# Patient Record
Sex: MALE | Race: WHITE | NOT HISPANIC OR LATINO | Employment: UNEMPLOYED | URBAN - NONMETROPOLITAN AREA
[De-identification: names, ages, dates, MRNs, and addresses within clinical notes are randomized per-mention and may not be internally consistent; named-entity substitution may affect disease eponyms.]

---

## 2020-04-18 ENCOUNTER — HOSPITAL ENCOUNTER (INPATIENT)
Facility: HOSPITAL | Age: 60
LOS: 1 days | Discharge: NURSING, REHAB OR LONG-TERM CARE | End: 2020-04-20
Attending: FAMILY MEDICINE | Admitting: INTERNAL MEDICINE

## 2020-04-18 ENCOUNTER — APPOINTMENT (OUTPATIENT)
Dept: CT IMAGING | Facility: HOSPITAL | Age: 60
End: 2020-04-18

## 2020-04-18 ENCOUNTER — APPOINTMENT (OUTPATIENT)
Dept: GENERAL RADIOLOGY | Facility: HOSPITAL | Age: 60
End: 2020-04-18

## 2020-04-18 DIAGNOSIS — R53.83 FATIGUE, UNSPECIFIED TYPE: Primary | ICD-10-CM

## 2020-04-18 DIAGNOSIS — G89.4 CHRONIC PAIN SYNDROME: ICD-10-CM

## 2020-04-18 DIAGNOSIS — L03.119 CELLULITIS OF LOWER EXTREMITY, UNSPECIFIED LATERALITY: ICD-10-CM

## 2020-04-18 PROBLEM — E66.01 MORBID OBESITY (HCC): Chronic | Status: ACTIVE | Noted: 2020-04-18

## 2020-04-18 PROBLEM — E66.01 MORBID OBESITY (HCC): Status: ACTIVE | Noted: 2020-04-18

## 2020-04-18 PROBLEM — R53.1 GENERALIZED WEAKNESS: Chronic | Status: ACTIVE | Noted: 2020-04-18

## 2020-04-18 PROBLEM — R53.1 GENERALIZED WEAKNESS: Status: ACTIVE | Noted: 2020-04-18

## 2020-04-18 LAB
ALBUMIN SERPL-MCNC: 3.4 G/DL (ref 3.5–5.2)
ALBUMIN/GLOB SERPL: 1 G/DL
ALP SERPL-CCNC: 97 U/L (ref 39–117)
ALT SERPL W P-5'-P-CCNC: 54 U/L (ref 1–41)
ANION GAP SERPL CALCULATED.3IONS-SCNC: 14 MMOL/L (ref 5–15)
AST SERPL-CCNC: 46 U/L (ref 1–40)
BASOPHILS # BLD AUTO: 0.04 10*3/MM3 (ref 0–0.2)
BASOPHILS NFR BLD AUTO: 0.3 % (ref 0–1.5)
BILIRUB SERPL-MCNC: 0.9 MG/DL (ref 0.2–1.2)
BILIRUB UR QL STRIP: ABNORMAL
BUN BLD-MCNC: 18 MG/DL (ref 6–20)
BUN/CREAT SERPL: 17.8 (ref 7–25)
CALCIUM SPEC-SCNC: 9.4 MG/DL (ref 8.6–10.5)
CHLORIDE SERPL-SCNC: 93 MMOL/L (ref 98–107)
CK SERPL-CCNC: 446 U/L (ref 20–200)
CLARITY UR: ABNORMAL
CO2 SERPL-SCNC: 25 MMOL/L (ref 22–29)
COLOR UR: ABNORMAL
CREAT BLD-MCNC: 1.01 MG/DL (ref 0.76–1.27)
DEPRECATED RDW RBC AUTO: 40 FL (ref 37–54)
EOSINOPHIL # BLD AUTO: 0.11 10*3/MM3 (ref 0–0.4)
EOSINOPHIL NFR BLD AUTO: 0.8 % (ref 0.3–6.2)
ERYTHROCYTE [DISTWIDTH] IN BLOOD BY AUTOMATED COUNT: 13.1 % (ref 12.3–15.4)
GFR SERPL CREATININE-BSD FRML MDRD: 76 ML/MIN/1.73
GLOBULIN UR ELPH-MCNC: 3.5 GM/DL
GLUCOSE BLD-MCNC: 115 MG/DL (ref 65–99)
GLUCOSE UR STRIP-MCNC: NEGATIVE MG/DL
HCT VFR BLD AUTO: 44.5 % (ref 37.5–51)
HGB BLD-MCNC: 15 G/DL (ref 13–17.7)
HGB UR QL STRIP.AUTO: NEGATIVE
HOLD SPECIMEN: NORMAL
IMM GRANULOCYTES # BLD AUTO: 0.11 10*3/MM3 (ref 0–0.05)
IMM GRANULOCYTES NFR BLD AUTO: 0.8 % (ref 0–0.5)
KETONES UR QL STRIP: ABNORMAL
LEUKOCYTE ESTERASE UR QL STRIP.AUTO: NEGATIVE
LIPASE SERPL-CCNC: 94 U/L (ref 13–60)
LYMPHOCYTES # BLD AUTO: 2.21 10*3/MM3 (ref 0.7–3.1)
LYMPHOCYTES NFR BLD AUTO: 15.1 % (ref 19.6–45.3)
MAGNESIUM SERPL-MCNC: 1.9 MG/DL (ref 1.6–2.6)
MCH RBC QN AUTO: 28.4 PG (ref 26.6–33)
MCHC RBC AUTO-ENTMCNC: 33.7 G/DL (ref 31.5–35.7)
MCV RBC AUTO: 84.1 FL (ref 79–97)
MONOCYTES # BLD AUTO: 1.01 10*3/MM3 (ref 0.1–0.9)
MONOCYTES NFR BLD AUTO: 6.9 % (ref 5–12)
NEUTROPHILS # BLD AUTO: 11.15 10*3/MM3 (ref 1.7–7)
NEUTROPHILS NFR BLD AUTO: 76.1 % (ref 42.7–76)
NITRITE UR QL STRIP: NEGATIVE
NRBC BLD AUTO-RTO: 0 /100 WBC (ref 0–0.2)
PH UR STRIP.AUTO: 5.5 [PH] (ref 5–9)
PLATELET # BLD AUTO: 229 10*3/MM3 (ref 140–450)
PMV BLD AUTO: 11.1 FL (ref 6–12)
POTASSIUM BLD-SCNC: 4.6 MMOL/L (ref 3.5–5.2)
PROT SERPL-MCNC: 6.9 G/DL (ref 6–8.5)
PROT UR QL STRIP: NEGATIVE
RBC # BLD AUTO: 5.29 10*6/MM3 (ref 4.14–5.8)
SODIUM BLD-SCNC: 132 MMOL/L (ref 136–145)
SP GR UR STRIP: 1.02 (ref 1–1.03)
UROBILINOGEN UR QL STRIP: ABNORMAL
WBC NRBC COR # BLD: 14.63 10*3/MM3 (ref 3.4–10.8)
WHOLE BLOOD HOLD SPECIMEN: NORMAL

## 2020-04-18 PROCEDURE — 99285 EMERGENCY DEPT VISIT HI MDM: CPT

## 2020-04-18 PROCEDURE — 83690 ASSAY OF LIPASE: CPT | Performed by: FAMILY MEDICINE

## 2020-04-18 PROCEDURE — 70450 CT HEAD/BRAIN W/O DYE: CPT

## 2020-04-18 PROCEDURE — 80053 COMPREHEN METABOLIC PANEL: CPT | Performed by: FAMILY MEDICINE

## 2020-04-18 PROCEDURE — 83735 ASSAY OF MAGNESIUM: CPT | Performed by: FAMILY MEDICINE

## 2020-04-18 PROCEDURE — 71045 X-RAY EXAM CHEST 1 VIEW: CPT

## 2020-04-18 PROCEDURE — 82550 ASSAY OF CK (CPK): CPT | Performed by: FAMILY MEDICINE

## 2020-04-18 PROCEDURE — 93005 ELECTROCARDIOGRAM TRACING: CPT | Performed by: FAMILY MEDICINE

## 2020-04-18 PROCEDURE — 87635 SARS-COV-2 COVID-19 AMP PRB: CPT | Performed by: FAMILY MEDICINE

## 2020-04-18 PROCEDURE — 93010 ELECTROCARDIOGRAM REPORT: CPT | Performed by: INTERNAL MEDICINE

## 2020-04-18 PROCEDURE — 81003 URINALYSIS AUTO W/O SCOPE: CPT | Performed by: FAMILY MEDICINE

## 2020-04-18 PROCEDURE — 85025 COMPLETE CBC W/AUTO DIFF WBC: CPT | Performed by: FAMILY MEDICINE

## 2020-04-18 RX ORDER — SODIUM CHLORIDE 9 MG/ML
75 INJECTION, SOLUTION INTRAVENOUS CONTINUOUS
Status: DISCONTINUED | OUTPATIENT
Start: 2020-04-18 | End: 2020-04-20 | Stop reason: HOSPADM

## 2020-04-18 RX ORDER — ONDANSETRON 4 MG/1
4 TABLET, ORALLY DISINTEGRATING ORAL EVERY 6 HOURS PRN
Qty: 10 TABLET | Refills: 0 | Status: SHIPPED | OUTPATIENT
Start: 2020-04-18 | End: 2020-04-20 | Stop reason: HOSPADM

## 2020-04-18 RX ORDER — ASPIRIN 81 MG/1
81 TABLET ORAL DAILY
Status: DISCONTINUED | OUTPATIENT
Start: 2020-04-19 | End: 2020-04-20 | Stop reason: HOSPADM

## 2020-04-18 RX ORDER — HYDROCODONE BITARTRATE AND ACETAMINOPHEN 5; 325 MG/1; MG/1
1 TABLET ORAL EVERY 4 HOURS PRN
Status: DISCONTINUED | OUTPATIENT
Start: 2020-04-18 | End: 2020-04-20 | Stop reason: HOSPADM

## 2020-04-18 RX ORDER — SODIUM CHLORIDE 0.9 % (FLUSH) 0.9 %
10 SYRINGE (ML) INJECTION EVERY 12 HOURS SCHEDULED
Status: DISCONTINUED | OUTPATIENT
Start: 2020-04-18 | End: 2020-04-20 | Stop reason: HOSPADM

## 2020-04-18 RX ORDER — SODIUM CHLORIDE 9 MG/ML
125 INJECTION, SOLUTION INTRAVENOUS CONTINUOUS
Status: DISCONTINUED | OUTPATIENT
Start: 2020-04-18 | End: 2020-04-18

## 2020-04-18 RX ORDER — CEPHALEXIN 500 MG/1
500 CAPSULE ORAL 4 TIMES DAILY
Qty: 40 CAPSULE | Refills: 0 | Status: SHIPPED | OUTPATIENT
Start: 2020-04-18 | End: 2020-04-20 | Stop reason: HOSPADM

## 2020-04-18 RX ORDER — SODIUM CHLORIDE 0.9 % (FLUSH) 0.9 %
10 SYRINGE (ML) INJECTION AS NEEDED
Status: DISCONTINUED | OUTPATIENT
Start: 2020-04-18 | End: 2020-04-20 | Stop reason: HOSPADM

## 2020-04-18 RX ADMIN — SODIUM CHLORIDE 1000 ML: 900 INJECTION, SOLUTION INTRAVENOUS at 10:25

## 2020-04-18 NOTE — DISCHARGE PLACEMENT REQUEST
"Bj Cordero (59 y.o. Male)     Date of Birth Social Security Number Address Home Phone MRN    1960  [id number]  [place] [address] [phone] [id number]    Sabianist Marital Status          None        Admission Date Admission Type Admitting Provider Attending Provider Department, Room/Bed    20 Emergency  Boubacar Augustin MD [facility] Emergency Department,     Discharge Date Discharge Disposition Discharge Destination                       Attending Provider:  Boubacar Augustin MD    Allergies:  No Known Allergies    Isolation:  None   Infection:  None   Code Status:  Not on file    Ht:  172.7 cm (68\")   Wt:  142 kg (312 lb 3.2 oz)    Admission Cmt:  None   Principal Problem:  None                Active Insurance as of 2020     Primary Coverage     Payor Plan Insurance Group Employer/Plan Group    ANTHEM MEDICAID AN[place] MEDICAID [id number]     Payor Plan Address Payor Plan Phone Number Payor Plan Fax Number Effective Dates    [address] [address] [phone]  2020 - None Entered    [facility] [id number]       Subscriber Name Subscriber Birth Date Member ID       BJ CORDERO 1960 [id number]                 Emergency Contacts      (Rel.) Home Phone Work Phone Mobile Phone    Isma Pichardo (Relative) [phone] -- [phone]        Bj Cordero #[id number] (Acct:[id number] [facility]# [id number]) (:1960 59 y.o. M) PCP: PETRA BALES ([phone])   16 (Ready for Re-Eval)   Patient Care Timeline (2020 08:49 to 2020 17:00:10)     2020 Event Details User   08:49 Patient arrival  Eduardo Cabrera RN   08:49:36 Patient roomed in ED To room 16  Eduardo Cabrera RN   08:50:44 Assign Attending Boubacar Augustin MD assigned as Attending  Boubacar Augustin MD   08:50:44 Assign Provider  Boubacar Augustin MD   08:50:44 First Provider Evaluation of Patient  Boubacar Augustin MD   08:50:53 " "Disposition Selected  Boubacar Augustin MD   08:50:53 Discharge Disposition Selected ED Disposition set to Discharge  Boubacar Augustin MD   08:56:53 Waiting for Provider Status Selected  Marilu De La Garza RN   08:57 Vital Signs Vital Signs   Temp: 98.6 °F (37 °C) Temp src: Oral   Heart Rate: 109 Heart Rate Source: Monitor   Resp: 20 Resp Rate Source: Visual   BP: 170/70 BP Location: Left arm   BP Method: Automatic Patient Position: Lying   BMI (Calculated): 47.5    Oxygen Therapy   SpO2: 98 % Pulse Oximetry Type: Continuous   Device (Oxygen Therapy): room air    Vitals Timer   Restart Vitals Timer: Yes Restart Vitals Timer: Yes   Height and Weight   Height: 172.7 cm (68\") Height Method: Stated   Weight: 142 kg (312 lb 3.2 oz)Abnormal  Weight Method: Bed scale   Other flowsheet entries   Ideal Body Weight k.4     Marilu De La Garza RN   08:57:17 Chief Complaints Updated + Fall   + Weakness - Generalized  Marilu De La Garza RN   08:57:17 Trigger for Triage Start  Marilu De La Garza RN   08:57:17 Triage Started  Marilu De La Garza RN   08:58 Acuity/Destination Acuity/Destination   Patient Acuity: 3     Marilu De La Garza RN   08:58 Travel, Exposure, and Communicable Disease Screening Infection Risk - Communicable Disease Risk Screening   Do you currently have the following symptoms?: No    Travel Screening   Have you traveled in the last month?: No    Exposure Screening   Have you been exposed to any of these contagious diseases in the last month?: No     Marilu De La Garza RN   08:58 Triage Sepsis Screen Sepsis Screen (1=Yes, 0=No)   Documented or Suspected Infection: No Documented or Suspected Infection Indications: None   Acutely Altered Mental Status: No Temp <96.8 OR >100.9: No   Heart Rate >90: YesAbnormal  Respiratory Rate >20: No   SBP <90 : No MAP <65: No   Blood Glucose >140 (mg/dL) - Not diabetic: Unknown WBC <4 or >12 (10*3/mm3): Unknown   Bands > 10%: Unknown Abnormals (in addition to " "infection question): 1   Result of current screen is:: Negative     Marilu De La Garza RN   08:58 Outbreak Screening Outbreak Screening   In the last 14 days, have you had contact with anyone known to have the 2019 Novel Coronavirus or anyone being tested for the 2019 Novel Coronavirus?: No     Marilu De La Garza RN   08:58 Pain (Adult) Pain (Adult)   Presence of Pain: denies pain/discomfort Preferred Pain Scale: number (Numeric Rating Pain Scale)   Pain Rating (0-10): Rest: 0     Marilu De La Garza RN   08:58:29 Acuity 3 Selected  Marilu De La Garza RN   08:58:36 Allergies Reviewed   Marilu De La Garza RN   08:58:54 Triage Completed AUTOMATIC  Marilu De La Garza RN   08:58:58 Violence Risk Screen (Adult) Violence Risk Screen (Adult)   Feels Like Hurting Others: no Previous Attempt to Harm Others: no    Marilu De La Garza RN   08:59:01 [place] Suicide Severity Rating Scale Past Month (C-SSRS Screen Version) C-SSRS (Screen-Recent) Past Month   Q1 Wished to be Dead (Past Month): no Q2 Suicidal Thoughts (Past Month): no   Q6 Suicide Behavior (Lifetime): no Level of Risk per Screen: screen negative   Suicide Severity Scale Notification   Notified Attending Physician of Suicide Severity Scale: No     Marilu De La Garza RN   08:59:06 Abuse Screen (Adult) Abuse Screen (Adult)   Feels Unsafe at Home or Work/School: no Feels Threatened by Someone: no   Does Anyone Try to Keep You From Having Contact with Others or Doing Things Outside Your Home?: no Physical Signs of Abuse Present: no    Marilu De La Garza RN   09:00 Peripheral IV 04/18/20 0900 Right Antecubital Placed Placement Date/Time: 04/18/20 0900   Placed by External Staff?: EMS  Size (Gauge): 20 G  Orientation: Right  Location: Antecubital  Marilu De La Garza RN   09:00 HPI HPI   Stated Reason for Visit: pt reports he laid on the floor for 5-6 days \"unable to get up, decreased appetite, and generalized weakness\".  History Obtained From: patient    Marilu De La Garza, " RN   09:01 Arrival Documentation Prehospital Activity   Prehospital Treatment: Yes    Blood Glucose   Blood Glucose Meter (mg/dl): 130    Prehospital IV Access   Document Peripheral IV: Yes Crystalloid IV Solution: Normal saline    Marilu De La Garza RN   09:02 Falls PA Model Falls PA   Falls PA score: 4     Inpatient, Batch Job   09:21 Outbreak Screening Outbreak Screening   Do you currently have the following symptoms?: No In the last 14 days, have you had contact with anyone known to have the 2019 Novel Coronavirus or anyone being tested for the 2019 Novel Coronavirus?: No    Braxton Manjarrez RegSched Rep   09:25:11 Registration Completed  Braxton Manjarrez RegSched Rep   09:26:15 Orders Placed CBC & Differential ; Comprehensive Metabolic Panel ; Lipase ; Urinalysis With Culture If Indicated - Urine, Clean Catch ; CK ; Magnesium ; sodium chloride 0.9 % bolus 1,000 mL ; sodium chloride 0.9 % infusion ; XR Chest 1 View ; ECG 12 Lead  Boubacar Augustin MD   09:26:21 Orders Placed CBC Auto Differential  Boubacar Augustin MD   09:30:16 ED/UC IMAGING STARTED XR Chest 1 View  Crista Carver   09:30:16 Imaging Exam Started  Crista Carver   09:35:06 ED/UC IMAGING ENDED XR Chest 1 View  Crista Carver   09:35:06 Imaging Exam Ended  Crista Carver   10:00:40 XR Chest 1 View Resulted Collected: 4/18/2020 09:30   Last updated: 4/18/2020 10:01   Status: Final result  Interface, Rad Results [place] In   10:01:49 ED/UC IMAGING FINAL XR Chest 1 View  Interface, Rad Results [place] In   10:01:49 Imaging Final Result  Interface, Rad Results [place] In   10:01:49 Xray Final Result (Final result) XR CHEST 1 VW  Interface, Rad Results [place] In   10:05:50 Assign Nurse Viola Shankar, RN assigned as Registered Nurse  Viola Shankar RN   10:06 ECG Performed ECG 12 Lead - [[id number]]  Boubacar Augustin MD   10:06:14 ECG 12 Lead Resulted Collected: 4/18/2020 10:02   Last updated:  4/18/2020 10:06   Status: Preliminary result  Interface, Ekg Results In   10:06:20 ED/UC IMAGING PRELIMINARY READ ECG 12 Lead  Interface, Ekg Results In   10:06:20 Imaging Preliminary Result  Interface, Ekg Results In   10:08 Vital Signs Vital Signs   Heart Rate: 104 Resp: 20   Oxygen Therapy   SpO2: 98 %    Vitals Timer   Restart Vitals Timer: Yes     Viola Shankar RN   10:08 Device Vitals Device Data   BP: 184/68Abnormal  (Device Time: 10:08:00) Noninvasive MAP (mmHg): 98 (Device Time: 10:08:00)    Viola Shankar RN   10:13:26 Orders Acknowledged New  - CBC & Differential ; Comprehensive Metabolic Panel ; Lipase ; Urinalysis With Culture If Indicated - Urine, Clean Catch ; CK ; Magnesium ; sodium chloride 0.9 % bolus 1,000 mL ; sodium chloride 0.9 % infusion ; XR Chest 1 View ; ECG 12 Lead  Deleon, Zully D, LPN   10:13:34 Orders Completed ECG 12 Lead  Deleon, Zully D, LPN   10:13:34 Complete ECG 12 Lead Completed ECG 12 Lead  Deleon, Zully D, LPN   10:14 Specimens Collected Light Blue Top  - ID: [id number] Type: Blood   Gold Top - SST  - ID: [id number] Type: Blood  Personnel, Non Lab   10:14 Collect CBC Auto Differential Completed CBC Auto Differential  - Type: Blood  Viola Shankar RN   10:14 Collect CK Completed CK  - Type: Blood  Viola Shankar RN   10:14 Collect Comprehensive Metabolic Panel Completed Comprehensive Metabolic Panel  - Type: Blood  Viola Shankar RN   10:14 Collect Lipase Completed Lipase  - Type: Blood  Viola Shankar RN   10:14 Collect Magnesium Completed Magnesium  - Type: Blood  Viola Shankar RN   10:14 Specimens Collected Comprehensive Metabolic Panel  - ID: [id number] Type: Blood   Lipase  - ID: [id number] Type: Blood   CK  - ID: [id number] Type: Blood   Magnesium  - ID: [id number] Type: Blood   CBC Auto Differential  - ID: [id number] Type: Blood  Viola Shankar RN   10:14:19 Print Label for CBC Auto Differential  Completed CBC Auto Differential  - Type: Blood   Viola Shankar RN   10:14:19 Print Label for CK  Completed CK  - Type: Blood  Viola Shankar RN   10:14:19 Print Label for Comprehensive Metabolic Panel  Completed Comprehensive Metabolic Panel  - Type: Blood  Viola Shankar RN   10:14:19 Print Label for Lipase  Completed Lipase  - Type: Blood  Viola Shankar RN   10:14:19 Print Label for Magnesium  Completed Magnesium  - Type: Blood  Viola Shankar RN   10:19:33 CLEAN CATCH - Print Label for Urinalysis With Culture If Indicated - Urine, Clean Catch  Completed Urinalysis With Culture If Indicated - Urine, Clean Catch  - Type: Urine ; Source: Urine, Clean Catch  Viola Shankar RN   10:20 CLEAN CATCH - Collect Urinalysis With Culture If Indicated - Urine, Clean Catch Completed Urinalysis With Culture If Indicated - Urine, Clean Catch  - Type: Urine ; Source: Urine, Clean Catch  Viola Shankar RN   10:20 Specimens Collected Urinalysis With Culture If Indicated - Urine, Clean Catch  - ID: [id number] Type: Urine  Viola Shankar RN   10:23 Peripheral IV 04/18/20 1023 Left Antecubital Placed Placement Date/Time: 04/18/20 1023   Hand Hygiene Completed: Yes  Size (Gauge): 20 G  Orientation: Left  Location: Antecubital  Site Prep: Chlorhexidine  Total insertion attempts: 1  Patient Tolerance: Tolerated well  Viola Shankar RN   10:23:46 Peripheral IV 04/18/20 1023 Left Antecubital Assessment Site Assessment: Clean; Dry; Intact Line Status: Blood return noted; Saline locked   Dressing Type: Transparent     Viola Shankar RN   10:25 Medication New Bag sodium chloride 0.9 % bolus 1,000 mL - Dose: 1,000 mL ; Rate: 2,000 mL/hr ; Route: Intravenous ; Line: Peripheral IV 04/18/20 1023 Left Antecubital ; Scheduled Time: 0928  Viola Shankar RN   10:25:19 Orders Placed Extra Tubes  Boubacar Augustin MD   10:25:22 Orders Placed Light Blue Top  Boubacar Augustin MD   10:25:23 Orders Placed Gold Top - SST  Boubacar Augustin MD   10:25:26 Orders Acknowledged New   - Extra Tubes  Viola Shankar RN   10:26:02 ED Notes Patient has 1000 bag normal saline running started by EMS     Viola Shankar RN  04/18/20 1026    Viola Shankar RN   10:26:25 Safety (Adult) Safety (Adult)   Safety WDL: WDL except Additional Documentation: Safety Interventions (Group)   Safety Interventions   Safety Precautions/Falls Reduction: (call light at hand, assist with needs)     Viola Shankar RN   10:26:41 Cardiac (Adult) Cardiac (Adult)   Cardiac WDL: WDL except Additional Documentation: ECG (Group)   ECG   Rhythm: sinus tachycardia     Viola Shankar RN   10:26:54 Neuro Cognitive (Adult) Neuro Cognitive (Adult)   Cognitive/Neuro/Behavioral WDL: WDL     Viola Shankar RN   10:26:58 Peripheral Neurovascular (Adult) Peripheral Neurovascular (Adult)   Peripheral Neuro Vascular WDL: WDL except Additional Documentation: Edema (Group)    Viola Shaknar RN   10:27:08 Respiratory Respiratory   Airway WDL: WDL    Respiratory WDL   Respiratory WDL: WDL     Viola Shankar RN   10:27:10 Fall Risk Screen (Adult) [facility] High Risk Falls Assessment (If Fall score is >/=13, add the Fall Risk CPG to the care plan)   Fallen in past 6 months: 0--> No Mental Status: 0--> no mental status change   Elimination: 0--> No elimination issues Mobility: 2--> New mobility issue   Medications: 0--> No meds Nurses' Clinical Judgement: 10   Total Fall Risk Score: 12     Viola Shankar RN   10:27:23 Skin (Adult) Skin (Adult)   Skin WDL: WDL except; color; characteristics Skin Moisture: moist   Skin Integrity: rash; wound     Viola Shankar RN   10:28 Urinalysis With Culture If Indicated - Urine, Clean Catch Resulted Abnormal Result   Collected: 4/18/2020 10:20   Last updated: 4/18/2020 10:28   Status: Final result   Color, UA: Dark Yellow [Ref Range: Yellow, Straw, Dark Yellow, Pina]   Appearance, UA: Slightly CloudyAbnormal  [Ref Range: Clear]   pH, UA: 5.5 [Ref Range: 5.0 - 9.0]   Specific Gravity, UA: 1.020 [Ref  Range: 1.003 - 1.030]   Glucose, UA: Negative [Ref Range: Negative]   Ketones, UA: 15 mg/dL (1+)Abnormal  [Ref Range: Negative]   Bilirubin, UA: Small (1+)Abnormal  [Ref Range: Negative]   Blood, UA: Negative [Ref Range: Negative]   Protein, UA: Negative [Ref Range: Negative]   Leuk Esterase, UA: Negative [Ref Range: Negative]   Nitrite, UA: Negative [Ref Range: Negative]   Urobilinogen, UA: 4.0 E.U./dLAbnormal  [Ref Range: 0.2 - 1.0 E.U./dL]  Cheyenne Lin   10:28 CBC & Differential Resulted Collected: 4/18/2020 10:14   Last updated: 4/18/2020 10:28   Status: Final result  Lab, Background User   10:28 CBC Auto Differential Resulted Abnormal Result   Collected: 4/18/2020 10:14   Last updated: 4/18/2020 10:28   Status: Final result   WBC: 14.63 10*3/kc7Mcnv  [Ref Range: 3.40 - 10.80]   RBC: 5.29 10*6/mm3 [Ref Range: 4.14 - 5.80]   Hemoglobin: 15.0 g/dL [Ref Range: 13.0 - 17.7]   Hematocrit: 44.5 % [Ref Range: 37.5 - 51.0]   MCV: 84.1 fL [Ref Range: 79.0 - 97.0]   MCH: 28.4 pg [Ref Range: 26.6 - 33.0]   MCHC: 33.7 g/dL [Ref Range: 31.5 - 35.7]   RDW: 13.1 % [Ref Range: 12.3 - 15.4]   RDW-SD: 40.0 fl [Ref Range: 37.0 - 54.0]   MPV: 11.1 fL [Ref Range: 6.0 - 12.0]   Platelets: 229 10*3/mm3 [Ref Range: 140 - 450]   Neutrophil %: 76.1 %High  [Ref Range: 42.7 - 76.0]   Lymphocyte %: 15.1 %Low  [Ref Range: 19.6 - 45.3]   Monocyte %: 6.9 % [Ref Range: 5.0 - 12.0]   Eosinophil %: 0.8 % [Ref Range: 0.3 - 6.2]   Basophil %: 0.3 % [Ref Range: 0.0 - 1.5]   Immature Grans %: 0.8 %High  [Ref Range: 0.0 - 0.5]   Neutrophils, Absolute: 11.15 10*3/ae6Szbr  [Ref Range: 1.70 - 7.00]   Lymphocytes, Absolute: 2.21 10*3/mm3 [Ref Range: 0.70 - 3.10]   Monocytes, Absolute: 1.01 10*3/kf2Kuzm  [Ref Range: 0.10 - 0.90]   Eosinophils, Absolute: 0.11 10*3/mm3 [Ref Range: 0.00 - 0.40]   Basophils, Absolute: 0.04 10*3/mm3 [Ref Range: 0.00 - 0.20]   Immature Grans, Absolute: 0.11 10*3/vt7Rvwz  [Ref Range: 0.00 - 0.05]   nRBC: 0.0 /100 WBC [Ref  Range: 0.0 - 0.2]  Lab, Background User   10:28:41 Lab Resulted (Final result) CBC AND DIFFERENTIAL  Lab, Background User   10:28:41 Lab Resulted (Final result) CBC WITH AUTO DIFFERENTIAL  Lab, Background User   10:28:55 History Reviewed Sections reviewed - Medical, Surgical, Alcohol, Tobacco, Drug Use, Sexual Activity, Social Documentation, Family  Viola Shankar RN   10:28:56 Lab Resulted (Final result) URINALYSIS W/ CULTURE IF INDICATED  Lab, Background User   10:28:59 Home Medications Reviewed  Viola Shankar RN   10:30 Device Vitals Device Data   Heart Rate: 102 (Device Time: 10:30:00) SpO2: 95 % (Device Time: 10:30:00)   BP: 132/66 (Device Time: 10:30:00) Noninvasive MAP (mmHg): 91 (Device Time: 10:30:00)    Viola Shankar RN   10:44 Comprehensive Metabolic Panel Resulted Abnormal Result   Collected: 4/18/2020 10:14   Last updated: 4/18/2020 10:44   Status: Final result   Glucose: 115 mg/dLHigh  [Ref Range: 65 - 99]   BUN: 18 mg/dL [Ref Range: 6 - 20]   Creatinine: 1.01 mg/dL [Ref Range: 0.76 - 1.27]   Sodium: 132 mmol/LLow  [Ref Range: 136 - 145]   Potassium: 4.6 mmol/L [Ref Range: 3.5 - 5.2]   Chloride: 93 mmol/LLow  [Ref Range: 98 - 107]   CO2: 25.0 mmol/L [Ref Range: 22.0 - 29.0]   Calcium: 9.4 mg/dL [Ref Range: 8.6 - 10.5]   Total Protein: 6.9 g/dL [Ref Range: 6.0 - 8.5]   Albumin: 3.40 g/dLLow  [Ref Range: 3.50 - 5.20]   ALT (SGPT): 54 U/LHigh  [Ref Range: 1 - 41]   AST (SGOT): 46 U/LHigh  [Ref Range: 1 - 40]   Alkaline Phosphatase: 97 U/L [Ref Range: 39 - 117]   Total Bilirubin: 0.9 mg/dL [Ref Range: 0.2 - 1.2]   eGFR Non  Amer: 76 mL/min/1.73 [Ref Range: >60]   Globulin: 3.5 gm/dL   A/G Ratio: 1.0 g/dL   BUN/Creatinine Ratio: 17.8 [Ref Range: 7.0 - 25.0]   Anion Gap: 14.0 mmol/L [Ref Range: 5.0 - 15.0]  Lab, Background User   10:44 Lipase Resulted Abnormal Result   Collected: 4/18/2020 10:14   Last updated: 4/18/2020 10:44   Status: Final result   Lipase: 94 U/LHigh  [Ref Range: 13 - 60]   Lab, Background User   10:44 CK Resulted Abnormal Result   Collected: 4/18/2020 10:14   Last updated: 4/18/2020 10:44   Status: Final result   Creatine Kinase: 446 U/LHigh  [Ref Range: 20 - 200]  Lab, Background User   10:44 Magnesium Resulted Collected: 4/18/2020 10:14   Last updated: 4/18/2020 10:44   Status: Final result   Magnesium: 1.9 mg/dL [Ref Range: 1.6 - 2.6]  Lab, Background User   10:44:24 Lab Resulted (Final result) MAGNESIUM  Lab, Background User   10:44:24 Lab Resulted (Final result) CK  Lab, Background User   10:44:24 Lab Resulted (Final result) LIPASE  Lab, Background User   10:44:24 Lab Resulted (Final result) COMPREHENSIVE METABOLIC PANEL  Lab, Background User   11:00 Vital Signs Vital Signs   Resp: 20    Vitals Timer   Restart Vitals Timer: Yes     Viola Shankar RN   11:00 Device Vitals Device Data   Heart Rate: 98 (Device Time: 11:00:00) SpO2: 98 % (Device Time: 11:00:00)   BP: 142/70 (Device Time: 11:00:00) Noninvasive MAP (mmHg): 95 (Device Time: 11:00:00)    Viola Shankar RN   11:16 Extra Tubes Resulted Collected: 4/18/2020 10:14   Last updated: 4/18/2020 11:16   Status: Final result  Lab, Background User   11:16 Light Blue Top Resulted Collected: 4/18/2020 10:14   Last updated: 4/18/2020 11:16   Status: Final result   Extra Tube: hold for add-on (Auto resulted)  Lab, Background User   11:16 Gold Top - SST Resulted Collected: 4/18/2020 10:14   Last updated: 4/18/2020 11:16   Status: Final result   Extra Tube: Hold for add-ons. (Auto resulted.)  Lab, Background User   12:06 Medication Stopped sodium chloride 0.9 % bolus 1,000 mL - Route: Intravenous ; Line: Peripheral IV 04/18/20 1023 Left Antecubital ; Scheduled Time: 1206  Viola Shankar RN   12:06 Intake/Output sodium chloride 0.9 % bolus 1,000 mL   Volume (mL): 1000     Viola Shankar RN   13:02 Falls PA Model Falls PA   Falls PA score: 12     Inpatient, Batch Job   13:08:25 ED Notes Tram and danielle given to patient     Vonnie,  LUL Rod  04/18/20 1308    Viola Shankar RN   13:22 Vital Signs Vital Signs   Resp: 20    Vitals Timer   Restart Vitals Timer: Yes     Viola Shankar RN   13:22 Device Vitals Device Data   Heart Rate: 102 (Device Time: 13:22:00) SpO2: 94 % (Device Time: 13:22:00)   BP: 123/87 (Device Time: 13:22:00) Noninvasive MAP (mmHg): 100 (Device Time: 13:22:00)    Viola Shankar RN   13:50:10 ED Notes Patient ambulated in hallway with nursing staff and this nurse with some difficulty.  Patient used a walker and had to stop and sit twice due to weakness. MD notified     Vonnie, Viola, RN  04/18/20 1351    Viola Shankar RN   13:56 Free Text COVID-19  testing offered but patient refused and states that he does not have the infection.   Walker given to patient in ED. He was able to ambulate with assistance in the ED. He is eating solids (ham sandwich) and liquids (3 glasses of water) in the ED.   Boubacar Augustin MD   13:56 Free Text Labs discussed in detail with patient.  He was also given the option for nursing home placement but has chosen to go home.  Boubacar Augustin MD   13:56:38 Orders Placed Ambulate In [place] ; Obtain & Apply The Following-  Boubacar Augustin MD   14:02:11 Discharge Disposition Selected ED Disposition set to Discharge  Boubacar Augustin MD   14:02:11 Disposition Selected  Boubacar Augustin MD   14:02:12 Patient Ready to Discharge  Boubacar Augustin MD   14:05:23 Discharge Orders Placed cephalexin (KEFLEX) 500 MG capsule ; ondansetron ODT (ZOFRAN-ODT) 4 MG disintegrating tablet  Boubacar Augustin MD   14:05:45 AVS Printed COVID-19: Share the Facts   COVID-19: Overview & Impact   ED After Visit Summary  Boubacar Augustin MD   14:07:33 Orders Acknowledged New  - Ambulate In [place] ; Obtain & Apply The Following-  Viola Shankar RN   14:07:37 Orders Completed Ambulate In [place]  Viola Shankar RN   14:07:37 Ambulate In [place] Completed Ambulate In [place]  Viola Shankar, LUL    14:34:24 Orders Completed Obtain & Apply The Following-  Viola Shankar RN   14:34:24 Obtain & Apply The Following- Completed Obtain & Apply The Following-  Viola Shankar RN   14:44:24 ED Notes Patient is calling ride.     Viola Shankar RN  04/18/20 1444    Vioal Shankar RN   15:02:07 ED Notes Addendum Called Isma B. On patients emergency contact list.  He will come pick patient up.       Viola Shankar RN  04/18/20 1503    Viola Shankar RN   16:10 ED Notes Patient returned to room.  Patient was not able to get into ride outside with 3 nurses and 2 other hospital personal.       Viola Shankar RN  04/18/20 1620    Viola Shankar RN   16:15 Device Vitals Device Data   BP: 148/89 (Device Time: 16:15:00) Noninvasive MAP (mmHg): 114 (Device Time: 16:15:00)    Viola Shankar RN   16:15:14 Orders Placed CT Head Without Contrast  Boubacar Augustin MD   16:16:32 In Process Status Selected  Elsy Price RN   16:17 Vital Signs Vital Signs   Resp: 20    Vitals Timer   Restart Vitals Timer: Yes     Viola Shankar RN   16:17 Device Vitals Device Data   Heart Rate: 96 (Device Time: 16:17:00) SpO2: 96 % (Device Time: 16:17:00)    Viola Shankar RN   16:20:43 Orders Acknowledged New  - CT Head Without Contrast  Viola Shankar RN   16:37:56 ED/UC IMAGING STARTED CT Head Without Contrast  Mohan Torres   16:37:56 Imaging Exam Started  Mohan Torres   16:39:17 ED/UC IMAGING ENDED CT Head Without Contrast  Mohan Torres   16:39:17 Imaging Exam Ended  Mohan Torres   16:53 Discharge Plan Plan   Plan Comments: Spoke with Dr. Augustin earlier today about patient. He is unable to care for himself but does not meet criteria for admission to the hospital. Nursing attempted to assist patient to get in car to discharge home and was unable. Spoke to the patient by phone and he wants to be placed in a rehab facility. He stated he could not care for himself anymore and does not have any help at home. I contacted  [facility] and spoke with staff there. They agreed to look at patient and clinicals will be faxed to them. XeniaRN, Christina Justice RN   16:57:21 CT Head Without Contrast Resulted Collected: 4/18/2020 16:34   Last updated: 4/18/2020 16:58   Status: Final result  Interface, Rad Results [place] In   16:58:25 ED/UC IMAGING FINAL CT Head Without Contrast  Interface, Rad Results [place] In   16:58:25 Imaging Final Result  Interface, Rad Results [place] In   16:58:25 CT Final Result (Final result) CT HEAD WO CONTRAST  Interface, Rad Results [place] In

## 2020-04-18 NOTE — ED NOTES
Patient ambulated in hallway with nursing staff and this nurse with some difficulty.  Patient used a walker and had to stop and sit twice due to weakness. MD notified     Viola Shankar, RN  04/18/20 3267

## 2020-04-18 NOTE — ED NOTES
Mirtha Carey at [phone] with adult protective services called.  Would like to be called when patient is taken to nursing home or admitted.      Viola Shankar RN  04/18/20 3272

## 2020-04-18 NOTE — DISCHARGE PLACEMENT REQUEST
"Bj Cordero (59 y.o. Male)     Date of Birth Social Security Number Address Home Phone MRN    1960  [id number]  [place] [address] [phone] [id number]    Gnosticism Marital Status          None        Admission Date Admission Type Admitting Provider Attending Provider Department, Room/Bed    4/18/20 Emergency  Boubacar Augustin MD [facility] Emergency Department, 16/16    Discharge Date Discharge Disposition Discharge Destination                       Attending Provider:  Boubacar Augustin MD    Allergies:  No Known Allergies    Isolation:  None   Infection:  None   Code Status:  Not on file    Ht:  172.7 cm (68\")   Wt:  142 kg (312 lb 3.2 oz)    Admission Cmt:  None   Principal Problem:  None                Active Insurance as of 4/18/2020     Primary Coverage     Payor Plan Insurance Group Employer/Plan Group    [employer] MEDICAID [employer] MEDICAID [id number]     Payor Plan Address Payor Plan Phone Number Payor Plan Fax Number Effective Dates    [address] [address] [phone]  2/12/2020 - None Entered    [facility] [id number]       Subscriber Name Subscriber Birth Date Member ID       BJ CORDERO 1960 [id number]                 Emergency Contacts      (Rel.) Home Phone Work Phone Mobile Phone    Isma Pichardo (Relative) [phone] -- [phone]               Emergency Department Notes      Viola Shankar RN at 04/18/20 1026        Patient has 1000 bag normal saline running started by EMS     Viola Shankar RN  04/18/20 1026      Electronically signed by Viola Shankar RN at 04/18/20 1026     Viola Shankar, RN at 04/18/20 1308        [place] and danielle given to patient     Viola Shankar RN  04/18/20 1308      Electronically signed by Viola Shankar RN at 04/18/20 1308     Viola Shankar, RN at 04/18/20 1350        Patient ambulated in hallway with nursing staff and this nurse with some difficulty.  Patient used a walker and had to " stop and sit twice due to weakness. MD notified     Vonnie, Viola, RN  04/18/20 1351      Electronically signed by Viola Shankar RN at 04/18/20 1351     Viola Shankar RN at 04/18/20 1444        Patient is calling ride.     Viola Shankar RN  04/18/20 1444      Electronically signed by Viola Shankar RN at 04/18/20 1444     Viola Shankar RN at 04/18/20 1502        Called Isma ZARCO On patients emergency contact list.  He will come pick patient up.       Viola Shankar RN  04/18/20 1503      Electronically signed by Viola Shankar RN at 04/18/20 1503     Viola Shankra RN at 04/18/20 1610        Patient returned to room.  Patient was not able to get into ride outside with 3 nurses and 2 other hospital personal.       Viola Shankar RN  04/18/20 1620      Electronically signed by Viola Shankar RN at 04/18/20 1620       Vital Signs (last day)     Date/Time   Temp   Temp src   Pulse   Resp   BP   Patient Position   SpO2    04/18/20 1617   --   --   96   20   --   --   96    04/18/20 1615   --   --   --   --   148/89   --   --    04/18/20 1322   --   --   102   20   123/87   --   94    04/18/20 1100   --   --   98   20   142/70   --   98    04/18/20 1030   --   --   102   --   132/66   --   95    04/18/20 1008   --   --   104   20   (!) 184/68   --   98    04/18/20 0857   98.6 (37)   Oral   109   20   170/70   Lying   98              Prior to Admission Medications     None        Facility-Administered Medications as of 4/18/2020   Medication Dose Route Frequency Provider Last Rate Last Dose   • [COMPLETED] sodium chloride 0.9 % bolus 1,000 mL  1,000 mL Intravenous Once Boubacar Augustin MD   Stopped at 04/18/20 1206   • sodium chloride 0.9 % infusion  125 mL/hr Intravenous Continuous Boubacar Augustin MD           Physician Progress Notes (all)    No notes of this type exist for this encounter.         Consult Notes (all)    No notes of this type exist for this encounter.

## 2020-04-18 NOTE — ED PROVIDER NOTES
Subjective   Patient states that he has been tired and weak for the past 7 days.  He has spent a lot of time lying on the floor and states that he is too weak to get up and go to the bathroom on the toilet.  He has had decreased oral intake and states that he has not eaten in 1 week and has decreased urinary output.      Weakness - Generalized   Severity:  Moderate  Onset quality:  Gradual  Duration:  1 week  Timing:  Constant  Progression:  Worsening  Chronicity:  New  Relieved by:  Nothing  Worsened by:  Activity  Ineffective treatments:  None tried  Associated symptoms: dizziness    Associated symptoms: no cough, no diarrhea, no dysuria, no fever, no frequency, no myalgias, no shortness of breath and no urgency        Review of Systems   Constitutional: Positive for activity change, appetite change and fatigue. Negative for chills, diaphoresis and fever.   HENT: Negative for congestion, ear discharge, ear pain, nosebleeds, rhinorrhea, sinus pressure, sore throat and trouble swallowing.    Eyes: Negative for discharge and redness.   Respiratory: Negative for apnea, cough, chest tightness, shortness of breath and wheezing.    Gastrointestinal: Negative for diarrhea.   Endocrine: Negative for polyuria.   Genitourinary: Positive for difficulty urinating. Negative for dysuria, frequency and urgency.   Musculoskeletal: Negative for myalgias.   Skin: Positive for wound. Negative for color change and rash.   Allergic/Immunologic: Negative for immunocompromised state.   Neurological: Positive for dizziness and weakness. Negative for syncope and light-headedness.   Hematological: Negative for adenopathy. Does not bruise/bleed easily.   Psychiatric/Behavioral: Negative for behavioral problems and confusion.   All other systems reviewed and are negative.      History reviewed. No pertinent past medical history.    No Known Allergies    History reviewed. No pertinent surgical history.    History reviewed. No pertinent family  history.    Social History     Socioeconomic History   • Marital status:      Spouse name: Not on file   • Number of children: Not on file   • Years of education: Not on file   • Highest education level: Not on file   Tobacco Use   • Smoking status: Never Smoker   • Smokeless tobacco: Never Used   Substance and Sexual Activity   • Alcohol use: Never     Frequency: Never   • Drug use: Never           Objective   Physical Exam   Constitutional: He is oriented to person, place, and time. He appears well-developed and well-nourished.   HENT:   Head: Normocephalic and atraumatic.   Nose: Nose normal.   Mouth/Throat: Oropharynx is clear and moist.   Eyes: Pupils are equal, round, and reactive to light. Conjunctivae and EOM are normal. Right eye exhibits no discharge. Left eye exhibits no discharge. No scleral icterus.   Neck: Normal range of motion. Neck supple. No tracheal deviation present.   Cardiovascular: Regular rhythm and normal heart sounds. Tachycardia present.   No murmur heard.  Pulmonary/Chest: Effort normal and breath sounds normal. No stridor. No respiratory distress. He has no wheezes. He has no rales.   Abdominal: Soft. Bowel sounds are normal. He exhibits no distension and no mass. There is no tenderness. There is no rebound and no guarding.   Musculoskeletal: He exhibits no edema.   Neurological: He is alert and oriented to person, place, and time. Coordination normal.   Skin: Skin is warm and dry. No rash noted. No erythema.   Multiple areas of skin breakdown possibly secondary to excoriations with scabbing and mild surrounding erythema, roughly 1 to 2 cm in diameter, involving the bilateral lower extremities.   Psychiatric: He has a normal mood and affect. His behavior is normal. Thought content normal.   Nursing note and vitals reviewed.      Procedures           ED Course  ED Course as of Apr 18 2003   Sat Apr 18, 2020   7396 COVID-19  testing offered but patient refused and states that he  does not have the infection.   Walker given to patient in ED. He was able to ambulate with assistance in the ED. He is eating solids (ham sandwich) and liquids (3 glasses of water) in the ED.       [CB]   1356 Labs discussed in detail with patient.  He was also given the option for nursing home placement but has chosen to go home.    [CB]   1704 Nursing staff had a difficult time getting patient to his car secondary to his weakness.  Patient does not have good living conditions at home.  Case management was contacted and has consulted with the patient regarding nursing home placement.  Patient is currently in the ER waiting for nursing home placement versus being placed in the hospital as a bedded outpatient while waiting for nursing home placement.    [CB]      ED Course User Index  [CB] Boubacar Augustin MD             Labs Reviewed   COMPREHENSIVE METABOLIC PANEL - Abnormal; Notable for the following components:       Result Value    Glucose 115 (*)     Sodium 132 (*)     Chloride 93 (*)     Albumin 3.40 (*)     ALT (SGPT) 54 (*)     AST (SGOT) 46 (*)     All other components within normal limits    Narrative:     GFR Normal >60  Chronic Kidney Disease <60  Kidney Failure <15     LIPASE - Abnormal; Notable for the following components:    Lipase 94 (*)     All other components within normal limits   URINALYSIS W/ CULTURE IF INDICATED - Abnormal; Notable for the following components:    Appearance, UA Slightly Cloudy (*)     Ketones, UA 15 mg/dL (1+) (*)     Bilirubin, UA Small (1+) (*)     Urobilinogen, UA 4.0 E.U./dL (*)     All other components within normal limits    Narrative:     Urine microscopic not indicated.   CK - Abnormal; Notable for the following components:    Creatine Kinase 446 (*)     All other components within normal limits   CBC WITH AUTO DIFFERENTIAL - Abnormal; Notable for the following components:    WBC 14.63 (*)     Neutrophil % 76.1 (*)     Lymphocyte % 15.1 (*)     Immature Grans %  0.8 (*)     Neutrophils, Absolute 11.15 (*)     Monocytes, Absolute 1.01 (*)     Immature Grans, Absolute 0.11 (*)     All other components within normal limits   MAGNESIUM - Normal   SARS-COV-2 PCR (IN-HOUSE), NP SWAB IN TRANSPORT MEDIA   CBC AND DIFFERENTIAL    Narrative:     The following orders were created for panel order CBC & Differential.  Procedure                               Abnormality         Status                     ---------                               -----------         ------                     CBC Auto Differential[[id number]]        Abnormal            Final result                 Please view results for these tests on the individual orders.   EXTRA TUBES    Narrative:     The following orders were created for panel order Extra Tubes.  Procedure                               Abnormality         Status                     ---------                               -----------         ------                     Light Blue Top[[id number]]                                   Final result               Gold Top - SST[[id number]]                                   Final result                 Please view results for these tests on the individual orders.   LIGHT BLUE TOP   GOLD TOP - SST       CT Head Without Contrast   Final Result   CONCLUSION:   No acute process.   1.4 cm area of old ischemic change white matter right frontal   lobe.      [id number]      Electronically signed by:  Mynor Tom MD  4/18/2020 4:57 PM CDT   Workstation: [id number]      XR Chest 1 View   Final Result   CONCLUSION:   No acute pulmonary disease.      Electronically signed by:  Bubba Pina MD  4/18/2020 10:00 AM   CDT Workstation: [id number]          1700: received sign out from Dr. Augustin pending admission to nursing home. NH will accept pt after Covid testing. Contacted hospotialist agreed on bedded out patient.                                 MDM    Final diagnoses:   Fatigue, unspecified type   Cellulitis of lower extremity,  unspecified laterality            [place], APRN  04/18/20 2003

## 2020-04-19 ENCOUNTER — APPOINTMENT (OUTPATIENT)
Dept: CARDIOLOGY | Facility: HOSPITAL | Age: 60
End: 2020-04-19

## 2020-04-19 LAB
ALBUMIN SERPL-MCNC: 3.2 G/DL (ref 3.5–5.2)
ALBUMIN/GLOB SERPL: 1 G/DL
ALP SERPL-CCNC: 88 U/L (ref 39–117)
ALT SERPL W P-5'-P-CCNC: 54 U/L (ref 1–41)
ANION GAP SERPL CALCULATED.3IONS-SCNC: 12 MMOL/L (ref 5–15)
AST SERPL-CCNC: 42 U/L (ref 1–40)
BH CV ECHO MEAS - ACS: 2.6 CM
BH CV ECHO MEAS - AI DEC SLOPE: 201 CM/SEC^2
BH CV ECHO MEAS - AI MAX PG: 63 MMHG
BH CV ECHO MEAS - AI MAX VEL: 397 CM/SEC
BH CV ECHO MEAS - AI P1/2T: 578.5 MSEC
BH CV ECHO MEAS - AO MAX PG (FULL): 3.7 MMHG
BH CV ECHO MEAS - AO MAX PG: 8.8 MMHG
BH CV ECHO MEAS - AO MEAN PG (FULL): 4 MMHG
BH CV ECHO MEAS - AO MEAN PG: 6 MMHG
BH CV ECHO MEAS - AO ROOT AREA (BSA CORRECTED): 1.5
BH CV ECHO MEAS - AO ROOT AREA: 10.8 CM^2
BH CV ECHO MEAS - AO ROOT DIAM: 3.7 CM
BH CV ECHO MEAS - AO V2 MAX: 148 CM/SEC
BH CV ECHO MEAS - AO V2 MEAN: 112 CM/SEC
BH CV ECHO MEAS - AO V2 VTI: 26.1 CM
BH CV ECHO MEAS - ASC AORTA: 3.2 CM
BH CV ECHO MEAS - AVA(I,A): 2.7 CM^2
BH CV ECHO MEAS - AVA(I,D): 2.7 CM^2
BH CV ECHO MEAS - AVA(V,A): 2.9 CM^2
BH CV ECHO MEAS - AVA(V,D): 2.9 CM^2
BH CV ECHO MEAS - BSA(HAYCOCK): 2.7 M^2
BH CV ECHO MEAS - BSA: 2.5 M^2
BH CV ECHO MEAS - BZI_BMI: 47.4 KILOGRAMS/M^2
BH CV ECHO MEAS - BZI_METRIC_HEIGHT: 172.7 CM
BH CV ECHO MEAS - BZI_METRIC_WEIGHT: 141.5 KG
BH CV ECHO MEAS - EDV(CUBED): 154.9 ML
BH CV ECHO MEAS - EDV(MOD-SP2): 83.1 ML
BH CV ECHO MEAS - EDV(MOD-SP4): 110 ML
BH CV ECHO MEAS - EDV(TEICH): 139.5 ML
BH CV ECHO MEAS - EF(CUBED): 72.1 %
BH CV ECHO MEAS - EF(MOD-SP2): 66.8 %
BH CV ECHO MEAS - EF(MOD-SP4): 69 %
BH CV ECHO MEAS - EF(TEICH): 63.3 %
BH CV ECHO MEAS - ESV(CUBED): 43.2 ML
BH CV ECHO MEAS - ESV(MOD-SP2): 27.6 ML
BH CV ECHO MEAS - ESV(MOD-SP4): 34.1 ML
BH CV ECHO MEAS - ESV(TEICH): 51.2 ML
BH CV ECHO MEAS - FS: 34.6 %
BH CV ECHO MEAS - IVS/LVPW: 1.1
BH CV ECHO MEAS - IVSD: 1.4 CM
BH CV ECHO MEAS - LA DIMENSION: 4 CM
BH CV ECHO MEAS - LA/AO: 1.1
BH CV ECHO MEAS - LV DIASTOLIC VOL/BSA (35-75): 44.6 ML/M^2
BH CV ECHO MEAS - LV MASS(C)D: 300.9 GRAMS
BH CV ECHO MEAS - LV MASS(C)DI: 121.9 GRAMS/M^2
BH CV ECHO MEAS - LV MAX PG: 5.1 MMHG
BH CV ECHO MEAS - LV MEAN PG: 2 MMHG
BH CV ECHO MEAS - LV SYSTOLIC VOL/BSA (12-30): 13.8 ML/M^2
BH CV ECHO MEAS - LV V1 MAX: 113 CM/SEC
BH CV ECHO MEAS - LV V1 MEAN: 71 CM/SEC
BH CV ECHO MEAS - LV V1 VTI: 18.4 CM
BH CV ECHO MEAS - LVIDD: 5.4 CM
BH CV ECHO MEAS - LVIDS: 3.5 CM
BH CV ECHO MEAS - LVLD AP2: 7.9 CM
BH CV ECHO MEAS - LVLD AP4: 8.4 CM
BH CV ECHO MEAS - LVLS AP2: 6.7 CM
BH CV ECHO MEAS - LVLS AP4: 7 CM
BH CV ECHO MEAS - LVOT AREA (M): 3.8 CM^2
BH CV ECHO MEAS - LVOT AREA: 3.8 CM^2
BH CV ECHO MEAS - LVOT DIAM: 2.2 CM
BH CV ECHO MEAS - LVPWD: 1.3 CM
BH CV ECHO MEAS - MV A MAX VEL: 52.7 CM/SEC
BH CV ECHO MEAS - MV DEC SLOPE: 600 CM/SEC^2
BH CV ECHO MEAS - MV E MAX VEL: 84.8 CM/SEC
BH CV ECHO MEAS - MV E/A: 1.6
BH CV ECHO MEAS - MV MAX PG: 2.8 MMHG
BH CV ECHO MEAS - MV MEAN PG: 1 MMHG
BH CV ECHO MEAS - MV P1/2T MAX VEL: 85.9 CM/SEC
BH CV ECHO MEAS - MV P1/2T: 41.9 MSEC
BH CV ECHO MEAS - MV V2 MAX: 83.3 CM/SEC
BH CV ECHO MEAS - MV V2 MEAN: 42.8 CM/SEC
BH CV ECHO MEAS - MV V2 VTI: 12.5 CM
BH CV ECHO MEAS - MVA P1/2T LCG: 2.6 CM^2
BH CV ECHO MEAS - MVA(P1/2T): 5.2 CM^2
BH CV ECHO MEAS - MVA(VTI): 5.6 CM^2
BH CV ECHO MEAS - PA MAX PG: 3.1 MMHG
BH CV ECHO MEAS - PA V2 MAX: 88.2 CM/SEC
BH CV ECHO MEAS - PI END-D VEL: 129 CM/SEC
BH CV ECHO MEAS - RAP SYSTOLE: 5 MMHG
BH CV ECHO MEAS - RVDD: 3 CM
BH CV ECHO MEAS - RVSP: 19.9 MMHG
BH CV ECHO MEAS - SI(AO): 113.7 ML/M^2
BH CV ECHO MEAS - SI(CUBED): 45.2 ML/M^2
BH CV ECHO MEAS - SI(LVOT): 28.3 ML/M^2
BH CV ECHO MEAS - SI(MOD-SP2): 22.5 ML/M^2
BH CV ECHO MEAS - SI(MOD-SP4): 30.7 ML/M^2
BH CV ECHO MEAS - SI(TEICH): 35.8 ML/M^2
BH CV ECHO MEAS - SV(AO): 280.6 ML
BH CV ECHO MEAS - SV(CUBED): 111.6 ML
BH CV ECHO MEAS - SV(LVOT): 69.9 ML
BH CV ECHO MEAS - SV(MOD-SP2): 55.5 ML
BH CV ECHO MEAS - SV(MOD-SP4): 75.9 ML
BH CV ECHO MEAS - SV(TEICH): 88.3 ML
BH CV ECHO MEAS - TR MAX VEL: 193 CM/SEC
BILIRUB SERPL-MCNC: 0.6 MG/DL (ref 0.2–1.2)
BUN BLD-MCNC: 14 MG/DL (ref 6–20)
BUN/CREAT SERPL: 15.1 (ref 7–25)
CALCIUM SPEC-SCNC: 9 MG/DL (ref 8.6–10.5)
CHLORIDE SERPL-SCNC: 94 MMOL/L (ref 98–107)
CHOLEST SERPL-MCNC: 114 MG/DL (ref 0–200)
CK SERPL-CCNC: 249 U/L (ref 20–200)
CO2 SERPL-SCNC: 23 MMOL/L (ref 22–29)
CREAT BLD-MCNC: 0.93 MG/DL (ref 0.76–1.27)
DEPRECATED RDW RBC AUTO: 39.8 FL (ref 37–54)
ERYTHROCYTE [DISTWIDTH] IN BLOOD BY AUTOMATED COUNT: 13.2 % (ref 12.3–15.4)
GFR SERPL CREATININE-BSD FRML MDRD: 83 ML/MIN/1.73
GLOBULIN UR ELPH-MCNC: 3.1 GM/DL
GLUCOSE BLD-MCNC: 117 MG/DL (ref 65–99)
HCT VFR BLD AUTO: 39.9 % (ref 37.5–51)
HDLC SERPL-MCNC: 38 MG/DL (ref 40–60)
HGB BLD-MCNC: 13.7 G/DL (ref 13–17.7)
LDLC SERPL CALC-MCNC: 56 MG/DL (ref 0–100)
LDLC/HDLC SERPL: 1.47 {RATIO}
MCH RBC QN AUTO: 28.5 PG (ref 26.6–33)
MCHC RBC AUTO-ENTMCNC: 34.3 G/DL (ref 31.5–35.7)
MCV RBC AUTO: 83 FL (ref 79–97)
PLATELET # BLD AUTO: 235 10*3/MM3 (ref 140–450)
PMV BLD AUTO: 10.8 FL (ref 6–12)
POTASSIUM BLD-SCNC: 3.6 MMOL/L (ref 3.5–5.2)
PROT SERPL-MCNC: 6.3 G/DL (ref 6–8.5)
RBC # BLD AUTO: 4.81 10*6/MM3 (ref 4.14–5.8)
SARS-COV-2 RNA RESP QL NAA+PROBE: NOT DETECTED
SODIUM BLD-SCNC: 129 MMOL/L (ref 136–145)
TRIGL SERPL-MCNC: 101 MG/DL (ref 0–150)
VLDLC SERPL-MCNC: 20.2 MG/DL
WBC NRBC COR # BLD: 12.36 10*3/MM3 (ref 3.4–10.8)

## 2020-04-19 PROCEDURE — 25010000002 PERFLUTREN (DEFINITY) 8.476 MG IN SODIUM CHLORIDE 0.9 % 10 ML INJECTION: Performed by: INTERNAL MEDICINE

## 2020-04-19 PROCEDURE — 82550 ASSAY OF CK (CPK): CPT | Performed by: INTERNAL MEDICINE

## 2020-04-19 PROCEDURE — 80053 COMPREHEN METABOLIC PANEL: CPT | Performed by: INTERNAL MEDICINE

## 2020-04-19 PROCEDURE — 93306 TTE W/DOPPLER COMPLETE: CPT

## 2020-04-19 PROCEDURE — 85027 COMPLETE CBC AUTOMATED: CPT | Performed by: INTERNAL MEDICINE

## 2020-04-19 PROCEDURE — 80061 LIPID PANEL: CPT | Performed by: INTERNAL MEDICINE

## 2020-04-19 PROCEDURE — 93306 TTE W/DOPPLER COMPLETE: CPT | Performed by: INTERNAL MEDICINE

## 2020-04-19 RX ORDER — NYSTATIN 100000 [USP'U]/G
POWDER TOPICAL EVERY 12 HOURS SCHEDULED
Status: DISCONTINUED | OUTPATIENT
Start: 2020-04-19 | End: 2020-04-20 | Stop reason: HOSPADM

## 2020-04-19 RX ADMIN — HYDROCODONE BITARTRATE AND ACETAMINOPHEN 1 TABLET: 5; 325 TABLET ORAL at 21:48

## 2020-04-19 RX ADMIN — ASPIRIN 81 MG: 81 TABLET, COATED ORAL at 08:16

## 2020-04-19 RX ADMIN — HYDROCODONE BITARTRATE AND ACETAMINOPHEN 1 TABLET: 5; 325 TABLET ORAL at 06:40

## 2020-04-19 RX ADMIN — HYDROCODONE BITARTRATE AND ACETAMINOPHEN 1 TABLET: 5; 325 TABLET ORAL at 12:07

## 2020-04-19 RX ADMIN — NYSTATIN: 100000 POWDER TOPICAL at 19:38

## 2020-04-19 RX ADMIN — PERFLUTREN 1.5 ML: 6.52 INJECTION, SUSPENSION INTRAVENOUS at 13:00

## 2020-04-19 RX ADMIN — SODIUM CHLORIDE, PRESERVATIVE FREE 10 ML: 5 INJECTION INTRAVENOUS at 19:38

## 2020-04-19 RX ADMIN — SODIUM CHLORIDE, PRESERVATIVE FREE 10 ML: 5 INJECTION INTRAVENOUS at 08:16

## 2020-04-19 NOTE — ED NOTES
Call from [facility] and they are requesting that patient get covid testing done before he cant be admitted to nursing home     Bob Avina RN  04/18/20 1912

## 2020-04-19 NOTE — H&P
History and Physical  Bryan Dao MD  Hospitalist    Date of admission: 4/18/2020    Patient Care Team:  Rita Herman APRN as PCP - General (Nurse Practitioner)    Chief complaint   Chief Complaint   Patient presents with   • Fall   • Weakness - Generalized     Subjective     Patient is a 59 y.o. male admitted for generalized weakness, poor appetite, unable to perform the ADLs and care for himself. I understand he has had several falls and that he has been unable to pick himself up from the floor. He is morbidly obese, his effort capacity is significantly limited and his wife cannot look after him at home anymore.    History  Past Medical History:   Diagnosis Date   • Hypercholesteremia    • Hypertension    • Morbid obesity (CMS/HCC)    • Stroke (cerebrum) (CMS/[facility])      History reviewed. No pertinent surgical history.  Family History   Problem Relation Age of Onset   • Hypertension Father      Social History     Tobacco Use   • Smoking status: Never Smoker   • Smokeless tobacco: Never Used   Substance Use Topics   • Alcohol use: Never     Frequency: Never   • Drug use: Never     No medications prior to admission.     Allergies:  Patient has no known allergies.    Review of Systems  Review of Systems   Constitutional: Positive for fatigue. Negative for fever.   Respiratory: Negative for cough, shortness of breath and stridor.    Cardiovascular: Positive for leg swelling. Negative for chest pain and palpitations (Minimal).   Gastrointestinal: Negative for abdominal distention, abdominal pain, anal bleeding, diarrhea, nausea and vomiting.   Genitourinary: Negative for dysuria, enuresis, flank pain, frequency, genital sores, hematuria, scrotal swelling and urgency.   Musculoskeletal: Positive for arthralgias. Negative for back pain, myalgias and neck stiffness.   Skin: Positive for pallor.   Neurological: Positive for weakness. Negative for seizures, syncope, numbness and headaches.   Psychiatric/Behavioral:  Negative for agitation, behavioral problems, confusion and decreased concentration.   All other systems reviewed and are negative.      Objective     Vital Signs  Temp:  [98.6 °F (37 °C)] 98.6 °F (37 °C)  Heart Rate:  [] 89  Resp:  [20] 20  BP: (123-184)/(66-97) 142/72    Physical Exam:  Physical Exam   Constitutional: He is oriented to person, place, and time. He appears ill. No distress.   Morbidly obese   HENT:   Head: Normocephalic and atraumatic.   Eyes: Pupils are equal, round, and reactive to light. EOM are normal. No scleral icterus.   Neck: Normal range of motion. Neck supple.   Cardiovascular: Normal rate and regular rhythm.   Pulmonary/Chest: Effort normal and breath sounds normal. No respiratory distress. He has no wheezes. He has no rales.   Abdominal: Soft. Bowel sounds are normal. He exhibits no distension and no mass. There is no tenderness. There is no guarding.   Musculoskeletal: Normal range of motion. He exhibits edema (Minimal). He exhibits no tenderness.   Neurological: He is alert and oriented to person, place, and time. No cranial nerve deficit. Coordination normal.   Skin: Skin is warm and dry. No rash noted. There is pallor.   Psychiatric: He has a normal mood and affect. His behavior is normal.   Vitals reviewed.      Results Review:   Lab Results (last 24 hours)     Procedure Component Value Units Date/Time    SARS-CoV-2, PCR (IN-HOUSE), NP Swab in Transport Media - Swab, Nasopharynx [[id number]] Collected:  04/18/20 1926    Specimen:  Swab from Nasopharynx Updated:  04/18/20 1929    Extra Tubes [[id number]] Collected:  04/18/20 1014    Specimen:  Blood, Venous Line Updated:  04/18/20 1116    Narrative:       The following orders were created for panel order Extra Tubes.  Procedure                               Abnormality         Status                     ---------                               -----------         ------                     Light Blue Top[[id number]]                                    Final result               Gold Top - SST[[id number]]                                   Final result                 Please view results for these tests on the individual orders.    Light Blue Top [[id number]] Collected:  04/18/20 1014    Specimen:  Blood Updated:  04/18/20 1116     Extra Tube hold for add-on     Comment: Auto resulted       Gold Top - SST [[id number]] Collected:  04/18/20 1014    Specimen:  Blood Updated:  04/18/20 1116     Extra Tube Hold for add-ons.     Comment: Auto resulted.       Comprehensive Metabolic Panel [[id number]]  (Abnormal) Collected:  04/18/20 1014    Specimen:  Blood Updated:  04/18/20 1044     Glucose 115 mg/dL      BUN 18 mg/dL      Creatinine 1.01 mg/dL      Sodium 132 mmol/L      Potassium 4.6 mmol/L      Chloride 93 mmol/L      CO2 25.0 mmol/L      Calcium 9.4 mg/dL      Total Protein 6.9 g/dL      Albumin 3.40 g/dL      ALT (SGPT) 54 U/L      AST (SGOT) 46 U/L      Alkaline Phosphatase 97 U/L      Total Bilirubin 0.9 mg/dL      eGFR Non African Amer 76 mL/min/1.73      Globulin 3.5 gm/dL      A/G Ratio 1.0 g/dL      BUN/Creatinine Ratio 17.8     Anion Gap 14.0 mmol/L     Narrative:       GFR Normal >60  Chronic Kidney Disease <60  Kidney Failure <15      Lipase [[id number]]  (Abnormal) Collected:  04/18/20 1014    Specimen:  Blood Updated:  04/18/20 1044     Lipase 94 U/L     CK [[id number]]  (Abnormal) Collected:  04/18/20 1014    Specimen:  Blood Updated:  04/18/20 1044     Creatine Kinase 446 U/L     Magnesium [[id number]]  (Normal) Collected:  04/18/20 1014    Specimen:  Blood Updated:  04/18/20 1044     Magnesium 1.9 mg/dL     Urinalysis With Culture If Indicated - Urine, Clean Catch [[id number]]  (Abnormal) Collected:  04/18/20 1020    Specimen:  Urine, Clean Catch Updated:  04/18/20 1028     Color, UA Dark Yellow     Appearance, UA Slightly Cloudy     pH, UA 5.5     Specific Gravity, UA 1.020     Glucose, UA Negative     Ketones, UA 15 mg/dL (1+)      Bilirubin, UA Small (1+)     Blood, UA Negative     Protein, UA Negative     Leuk Esterase, UA Negative     Nitrite, UA Negative     Urobilinogen, UA 4.0 E.U./dL    Narrative:       Urine microscopic not indicated.    CBC & Differential [[id number]] Collected:  04/18/20 1014    Specimen:  Blood Updated:  04/18/20 1028    Narrative:       The following orders were created for panel order CBC & Differential.  Procedure                               Abnormality         Status                     ---------                               -----------         ------                     CBC Auto Differential[[id number]]        Abnormal            Final result                 Please view results for these tests on the individual orders.    CBC Auto Differential [[id number]]  (Abnormal) Collected:  04/18/20 1014    Specimen:  Blood Updated:  04/18/20 1028     WBC 14.63 10*3/mm3      RBC 5.29 10*6/mm3      Hemoglobin 15.0 g/dL      Hematocrit 44.5 %      MCV 84.1 fL      MCH 28.4 pg      MCHC 33.7 g/dL      RDW 13.1 %      RDW-SD 40.0 fl      MPV 11.1 fL      Platelets 229 10*3/mm3      Neutrophil % 76.1 %      Lymphocyte % 15.1 %      Monocyte % 6.9 %      Eosinophil % 0.8 %      Basophil % 0.3 %      Immature Grans % 0.8 %      Neutrophils, Absolute 11.15 10*3/mm3      Lymphocytes, Absolute 2.21 10*3/mm3      Monocytes, Absolute 1.01 10*3/mm3      Eosinophils, Absolute 0.11 10*3/mm3      Basophils, Absolute 0.04 10*3/mm3      Immature Grans, Absolute 0.11 10*3/mm3      nRBC 0.0 /100 WBC           Imaging Results (Last 24 Hours)     Procedure Component Value Units Date/Time    CT Head Without Contrast [[id number]] Collected:  04/18/20 1634     Updated:  04/18/20 1658    Narrative:         CT Head Without Contrast    History: Generalized weakness. Fatigue.    Axial scans of the brain were obtained without intravenous  contrast.  Coronal and sagital reconstructions were preformed.    This exam was performed according to our  departmental  dose-optimization program, which includes automated exposure  control, adjustment of the mA and/or kV according to patient size  and/or use of iterative reconstruction technique.    DLP: 1051.60    Comparison: None    Findings:  Bone windows are unremarkable.  The visualized paranasal sinuses are unremarkable.    No acute process.  1.4 cm area of old ischemic change white matter right frontal  lobe.  No hemorrhage.  No mass.  No abnormal areas of increased attenuation.  No midline shift.  No abnormal extra-axial fluid collections.      Impression:       CONCLUSION:  No acute process.  1.4 cm area of old ischemic change white matter right frontal  lobe.    [id number]    Electronically signed by:  Mynor Tom MD  4/18/2020 4:57 PM CDT  Workstation: [id number]    XR Chest 1 View [[id number]] Collected:  04/18/20 0930     Updated:  04/18/20 1001    Narrative:       PROCEDURE: Portable chest x-ray    TECHNIQUE: Single AP view of the chest    COMPARISON: None    HISTORY: fatigue    FINDINGS:  The lungs appear clear. Heart, hilar, and mediastinal structures  appear normal. No osseous abnormality is seen.      Impression:       CONCLUSION:  No acute pulmonary disease.    Electronically signed by:  Bubba Pina MD  4/18/2020 10:00 AM  CDT Workstation: [id number]          Assessment/Plan       Generalized weakness    Morbid obesity (CMS/HCC)    Hypertension    Fatigue    Supportive care / obtain Echo, lipid panel in AM. He is willing to go to a SNF once his Coronavirus screening test is obtained.    Bryan Dao MD  04/18/20  22:33

## 2020-04-19 NOTE — PROGRESS NOTES
[facility] Medicine Services  INPATIENT PROGRESS NOTE    Length of Stay: 0  Date of Admission: 4/18/2020  Primary Care Physician: Rita Herman APRN    Subjective     HPI: Good appetite.  No change per nursing report    Review of Systems   Denies headache, chest pain, vomiting, diarrhea, fever, rash.  All pertinent negatives and positives are as above. All other systems have been reviewed and are negative unless otherwise stated.     Objective    Temp:  [96.8 °F (36 °C)-98.6 °F (37 °C)] 96.8 °F (36 °C)  Heart Rate:  [] 100  Resp:  [18-20] 18  BP: (112-152)/(72-97) 112/80    Physical Exam  General: No apparent distress.  Alert and Appropriate  HEENT: oral and nasal mucosa without erythema/exudate  Neck: S/NT/ND; no LAD, bruits  CV: RRR, no m/r/g  Lung: CTA B; symmetric rise and fall of the chest  Abd: S/NT/ND, BS+  Ext: No Clubbing, cyanosis  Skin: no rash, ecchymosis, skin breakdown  MSK: no synovitis or deformity  Neuro: symmetric facial motor; spontaneous and purposeful movement of all four extremities  Psych: stable mood and apparent affect      Results Review:  I have reviewed the labs, radiology results, and diagnostic studies.    Laboratory Data:   Results from last 7 days   Lab Units 04/19/20  0643 04/18/20  1014   SODIUM mmol/L 129* 132*   POTASSIUM mmol/L 3.6 4.6   CHLORIDE mmol/L 94* 93*   CO2 mmol/L 23.0 25.0   BUN mg/dL 14 18   CREATININE mg/dL 0.93 1.01   GLUCOSE mg/dL 117* 115*   CALCIUM mg/dL 9.0 9.4   BILIRUBIN mg/dL 0.6 0.9   ALK PHOS U/L 88 97   ALT (SGPT) U/L 54* 54*   AST (SGOT) U/L 42* 46*   ANION GAP mmol/L 12.0 14.0     Estimated Creatinine Clearance: 119.8 mL/min (by C-G formula based on SCr of 0.93 mg/dL).  Results from last 7 days   Lab Units 04/18/20  1014   MAGNESIUM mg/dL 1.9         Results from last 7 days   Lab Units 04/19/20  0643 04/18/20  1014   WBC 10*3/mm3 12.36* 14.63*   HEMOGLOBIN g/dL 13.7 15.0   HEMATOCRIT % 39.9 44.5    PLATELETS 10*3/mm3 235 229           Culture Data:   No results found for: BLOODCX  No results found for: URINECX  No results found for: RESPCX  No results found for: WOUNDCX  No results found for: STOOLCX  No components found for: BODYFLD    Radiology Data:   Imaging Results (Last 24 Hours)     Procedure Component Value Units Date/Time    CT Head Without Contrast [[id number]] Collected:  04/18/20 1634     Updated:  04/18/20 1658    Narrative:         CT Head Without Contrast    History: Generalized weakness. Fatigue.    Axial scans of the brain were obtained without intravenous  contrast.  Coronal and sagital reconstructions were preformed.    This exam was performed according to our departmental  dose-optimization program, which includes automated exposure  control, adjustment of the mA and/or kV according to patient size  and/or use of iterative reconstruction technique.    DLP: 1051.60    Comparison: None    Findings:  Bone windows are unremarkable.  The visualized paranasal sinuses are unremarkable.    No acute process.  1.4 cm area of old ischemic change white matter right frontal  lobe.  No hemorrhage.  No mass.  No abnormal areas of increased attenuation.  No midline shift.  No abnormal extra-axial fluid collections.      Impression:       CONCLUSION:  No acute process.  1.4 cm area of old ischemic change white matter right frontal  lobe.    [id number]    Electronically signed by:  Mynor Tom MD  4/18/2020 4:57 PM CDT  Workstation: [id number]          I have reviewed the patient's current medications.     Assessment/Plan     Active Hospital Problems    Diagnosis   • **Generalized weakness   • Morbid obesity (CMS/HCC)   • Fatigue   • Hypertension       Plan: 59-year-old severe morbid obesity, hypertension, hyperlipidemia, previous stroke, here with    1.  Severe debility, functional paraplegia  2.  Severe morbid obesity  3.  Hypertension    Patient does not have a safe discharge and has longstanding and interval  worsening of debility attributed to his body habitus.  Work with physical occupational therapy once COVID-19 test results negative.  Plan for referral to [facility] who is already accepted him pending a negative test.              Discharge Planning: I expect patient to be discharged to SNF in 1 days.    Bubba Wood MD  04/19/20  14:16

## 2020-04-19 NOTE — ED NOTES
Contacted Mirtha Aurelio with [facility] at [phone] with status update that pt is being admitted to room 633 at this time. Ms. Carey verbalizes understanding of this information.      Bob Avina RN  04/18/20 2031

## 2020-04-19 NOTE — PLAN OF CARE
Problem: Patient Care Overview  Goal: Plan of Care Review  Outcome: Ongoing (interventions implemented as appropriate)  Flowsheets (Taken 4/18/2020 2200 by Corrine Mendosa RN)  Plan of Care Reviewed With: patient

## 2020-04-20 VITALS
RESPIRATION RATE: 18 BRPM | HEIGHT: 68 IN | BODY MASS INDEX: 47.26 KG/M2 | TEMPERATURE: 97.7 F | WEIGHT: 311.8 LBS | SYSTOLIC BLOOD PRESSURE: 153 MMHG | OXYGEN SATURATION: 97 % | DIASTOLIC BLOOD PRESSURE: 82 MMHG | HEART RATE: 97 BPM

## 2020-04-20 RX ORDER — ASPIRIN 81 MG/1
81 TABLET ORAL DAILY
Qty: 90 TABLET | Refills: 0
Start: 2020-04-21

## 2020-04-20 RX ORDER — NYSTATIN 100000 [USP'U]/G
POWDER TOPICAL EVERY 12 HOURS SCHEDULED
Qty: 15 G | Refills: 0
Start: 2020-04-20 | End: 2020-04-27

## 2020-04-20 RX ORDER — HYDROCODONE BITARTRATE AND ACETAMINOPHEN 5; 325 MG/1; MG/1
1 TABLET ORAL EVERY 4 HOURS PRN
Qty: 12 TABLET | Refills: 0 | Status: SHIPPED | OUTPATIENT
Start: 2020-04-20

## 2020-04-20 RX ADMIN — NYSTATIN: 100000 POWDER TOPICAL at 08:28

## 2020-04-20 RX ADMIN — SODIUM CHLORIDE, PRESERVATIVE FREE 10 ML: 5 INJECTION INTRAVENOUS at 08:28

## 2020-04-20 RX ADMIN — ASPIRIN 81 MG: 81 TABLET, COATED ORAL at 08:28

## 2020-04-20 NOTE — DISCHARGE PLACEMENT REQUEST
"Bj Cordero (59 y.o. Male)     Date of Birth Social Security Number Address Home Phone MRN    1960  [id number]  [place] [address] [phone] [id number]    [employer] Mar[employer] Status          None        Admission Date Admission Type Admitting Provider Attending Provider Department, Room/Bed    4/18/20 Emergency Bryan Dao MD Popescu, Tudor, MD [address], 405/1    Discharge Date Discharge Disposition Discharge Destination                       Attending Provider:  Bryan Dao MD    Allergies:  No Known Allergies    Isolation:  None   Infection:  None   Code Status:  CPR    Ht:  172.7 cm (68\")   Wt:  141 kg (311 lb 12.8 oz)    Admission Cmt:  None   Principal Problem:  Generalized weakness [R53.1]                 Active Insurance as of 4/18/2020     Primary Coverage     Payor Plan Insurance Group Employer/Plan Group    [employer] MEDICAID [place] [employer] [id number]     Payor Plan Address Payor Plan Phone Number Payor Plan Fax Number Effective Dates    [address] [address] [phone]  2/12/2020 - None Entered    [facility] [id number]       Subscriber Name Subscriber Birth Date Member ID       BJ CORDERO 1960 [id number]                 Emergency Contacts      (Rel.) Home Phone Work Phone Mobile Phone    Isma Pichardo (Relative) [phone] -- [phone]               History & Physical      Bryan Dao MD at 04/18/20 2011          History and Physical  Bryan Dao MD  Hospitalist    Date of admission: 4/18/2020    Patient Care Team:  Rita Herman APRN as PCP - General (Nurse Practitioner)    Chief complaint   Chief Complaint   Patient presents with   • Fall   • Weakness - Generalized     Subjective     Patient is a 59 y.o. male admitted for generalized weakness, poor appetite, unable to perform the ADLs and care for himself. I understand he has had several falls and that he has been unable to pick himself up from the floor. " He is morbidly obese, his effort capacity is significantly limited and his wife cannot look after him at home anymore.    History  Past Medical History:   Diagnosis Date   • Hypercholesteremia    • Hypertension    • Morbid obesity (CMS/HCC)    • Stroke (cerebrum) (CMS/[facility])      History reviewed. No pertinent surgical history.  Family History   Problem Relation Age of Onset   • Hypertension Father      Social History     Tobacco Use   • Smoking status: Never Smoker   • Smokeless tobacco: Never Used   Substance Use Topics   • Alcohol use: Never     Frequency: Never   • Drug use: Never     No medications prior to admission.     Allergies:  Patient has no known allergies.    Review of Systems  Review of Systems   Constitutional: Positive for fatigue. Negative for fever.   Respiratory: Negative for cough, shortness of breath and stridor.    Cardiovascular: Positive for leg swelling. Negative for chest pain and palpitations (Minimal).   Gastrointestinal: Negative for abdominal distention, abdominal pain, anal bleeding, diarrhea, nausea and vomiting.   Genitourinary: Negative for dysuria, enuresis, flank pain, frequency, genital sores, hematuria, scrotal swelling and urgency.   Musculoskeletal: Positive for arthralgias. Negative for back pain, myalgias and neck stiffness.   Skin: Positive for pallor.   Neurological: Positive for weakness. Negative for seizures, syncope, numbness and headaches.   Psychiatric/Behavioral: Negative for agitation, behavioral problems, confusion and decreased concentration.   All other systems reviewed and are negative.      Objective     Vital Signs  Temp:  [98.6 °F (37 °C)] 98.6 °F (37 °C)  Heart Rate:  [] 89  Resp:  [20] 20  BP: (123-184)/(66-97) 142/72    Physical Exam:  Physical Exam   Constitutional: He is oriented to person, place, and time. He appears ill. No distress.   Morbidly obese   HENT:   Head: Normocephalic and atraumatic.   Eyes: Pupils are equal, round, and reactive to  light. EOM are normal. No scleral icterus.   Neck: Normal range of motion. Neck supple.   Cardiovascular: Normal rate and regular rhythm.   Pulmonary/Chest: Effort normal and breath sounds normal. No respiratory distress. He has no wheezes. He has no rales.   Abdominal: Soft. Bowel sounds are normal. He exhibits no distension and no mass. There is no tenderness. There is no guarding.   Musculoskeletal: Normal range of motion. He exhibits edema (Minimal). He exhibits no tenderness.   Neurological: He is alert and oriented to person, place, and time. No cranial nerve deficit. Coordination normal.   Skin: Skin is warm and dry. No rash noted. There is pallor.   Psychiatric: He has a normal mood and affect. His behavior is normal.   Vitals reviewed.      Results Review:   Lab Results (last 24 hours)     Procedure Component Value Units Date/Time    SARS-CoV-2, PCR (IN-HOUSE), NP Swab in Transport Media - Swab, Nasopharynx [[id number]] Collected:  04/18/20 1926    Specimen:  Swab from Nasopharynx Updated:  04/18/20 1929    Extra Tubes [[id number]] Collected:  04/18/20 1014    Specimen:  Blood, Venous Line Updated:  04/18/20 1116    Narrative:       The following orders were created for panel order Extra Tubes.  Procedure                               Abnormality         Status                     ---------                               -----------         ------                     Light Blue Top[[id number]]                                   Final result               Gold Top - SST[[id number]]                                   Final result                 Please view results for these tests on the individual orders.    Light Blue Top [[id number]] Collected:  04/18/20 1014    Specimen:  Blood Updated:  04/18/20 1116     Extra Tube hold for add-on     Comment: Auto resulted       Gold Top - SST [[id number]] Collected:  04/18/20 1014    Specimen:  Blood Updated:  04/18/20 1116     Extra Tube Hold for add-ons.     Comment: Auto  resulted.       Comprehensive Metabolic Panel [[id number]]  (Abnormal) Collected:  04/18/20 1014    Specimen:  Blood Updated:  04/18/20 1044     Glucose 115 mg/dL      BUN 18 mg/dL      Creatinine 1.01 mg/dL      Sodium 132 mmol/L      Potassium 4.6 mmol/L      Chloride 93 mmol/L      CO2 25.0 mmol/L      Calcium 9.4 mg/dL      Total Protein 6.9 g/dL      Albumin 3.40 g/dL      ALT (SGPT) 54 U/L      AST (SGOT) 46 U/L      Alkaline Phosphatase 97 U/L      Total Bilirubin 0.9 mg/dL      eGFR Non African Amer 76 mL/min/1.73      Globulin 3.5 gm/dL      A/G Ratio 1.0 g/dL      BUN/Creatinine Ratio 17.8     Anion Gap 14.0 mmol/L     Narrative:       GFR Normal >60  Chronic Kidney Disease <60  Kidney Failure <15      Lipase [[id number]]  (Abnormal) Collected:  04/18/20 1014    Specimen:  Blood Updated:  04/18/20 1044     Lipase 94 U/L     CK [[id number]]  (Abnormal) Collected:  04/18/20 1014    Specimen:  Blood Updated:  04/18/20 1044     Creatine Kinase 446 U/L     Magnesium [[id number]]  (Normal) Collected:  04/18/20 1014    Specimen:  Blood Updated:  04/18/20 1044     Magnesium 1.9 mg/dL     Urinalysis With Culture If Indicated - Urine, Clean Catch [[id number]]  (Abnormal) Collected:  04/18/20 1020    Specimen:  Urine, Clean Catch Updated:  04/18/20 1028     Color, UA Dark Yellow     Appearance, UA Slightly Cloudy     pH, UA 5.5     Specific Gravity, UA 1.020     Glucose, UA Negative     Ketones, UA 15 mg/dL (1+)     Bilirubin, UA Small (1+)     Blood, UA Negative     Protein, UA Negative     Leuk Esterase, UA Negative     Nitrite, UA Negative     Urobilinogen, UA 4.0 E.U./dL    Narrative:       Urine microscopic not indicated.    CBC & Differential [[id number]] Collected:  04/18/20 1014    Specimen:  Blood Updated:  04/18/20 1028    Narrative:       The following orders were created for panel order CBC & Differential.  Procedure                               Abnormality         Status                     ---------                                -----------         ------                     CBC Auto Differential[[id number]]        Abnormal            Final result                 Please view results for these tests on the individual orders.    CBC Auto Differential [[id number]]  (Abnormal) Collected:  04/18/20 1014    Specimen:  Blood Updated:  04/18/20 1028     WBC 14.63 10*3/mm3      RBC 5.29 10*6/mm3      Hemoglobin 15.0 g/dL      Hematocrit 44.5 %      MCV 84.1 fL      MCH 28.4 pg      MCHC 33.7 g/dL      RDW 13.1 %      RDW-SD 40.0 fl      MPV 11.1 fL      Platelets 229 10*3/mm3      Neutrophil % 76.1 %      Lymphocyte % 15.1 %      Monocyte % 6.9 %      Eosinophil % 0.8 %      Basophil % 0.3 %      Immature Grans % 0.8 %      Neutrophils, Absolute 11.15 10*3/mm3      Lymphocytes, Absolute 2.21 10*3/mm3      Monocytes, Absolute 1.01 10*3/mm3      Eosinophils, Absolute 0.11 10*3/mm3      Basophils, Absolute 0.04 10*3/mm3      Immature Grans, Absolute 0.11 10*3/mm3      nRBC 0.0 /100 WBC           Imaging Results (Last 24 Hours)     Procedure Component Value Units Date/Time    CT Head Without Contrast [[id number]] Collected:  04/18/20 1634     Updated:  04/18/20 1658    Narrative:         CT Head Without Contrast    History: Generalized weakness. Fatigue.    Axial scans of the brain were obtained without intravenous  contrast.  Coronal and sagital reconstructions were preformed.    This exam was performed according to our departmental  dose-optimization program, which includes automated exposure  control, adjustment of the mA and/or kV according to patient size  and/or use of iterative reconstruction technique.    DLP: 1051.60    Comparison: None    Findings:  Bone windows are unremarkable.  The visualized paranasal sinuses are unremarkable.    No acute process.  1.4 cm area of old ischemic change white matter right frontal  lobe.  No hemorrhage.  No mass.  No abnormal areas of increased attenuation.  No midline shift.  No abnormal  extra-axial fluid collections.      Impression:       CONCLUSION:  No acute process.  1.4 cm area of old ischemic change white matter right frontal  lobe.    [id number]    Electronically signed by:  Mynor Tom MD  4/18/2020 4:57 PM CDT  Workstation: [id number]    XR Chest 1 View [[id number]] Collected:  04/18/20 0930     Updated:  04/18/20 1001    Narrative:       PROCEDURE: Portable chest x-ray    TECHNIQUE: Single AP view of the chest    COMPARISON: None    HISTORY: fatigue    FINDINGS:  The lungs appear clear. Heart, hilar, and mediastinal structures  appear normal. No osseous abnormality is seen.      Impression:       CONCLUSION:  No acute pulmonary disease.    Electronically signed by:  Bubba Pina MD  4/18/2020 10:00 AM  CDT Workstation: [id number]          Assessment/Plan       Generalized weakness    Morbid obesity (CMS/HCC)    Hypertension    Fatigue    Supportive care / obtain Echo, lipid panel in AM. He is willing to go to a SNF once his Coronavirus screening test is obtained.    Bryan Dao MD  04/18/20  22:33      Electronically signed by Bryan Dao MD at 04/18/20 2235       Vital Signs (last day)     Date/Time   Temp   Temp src   Pulse   Resp   BP   Patient Position   SpO2    04/20/20 0750   97.7 (36.5)   Axillary   97   18   153/82   Lying   97    04/20/20 0306   98 (36.7)   Oral   89   18   110/67   Lying   94    04/19/20 2321   96.8 (36)   Oral   91   18   121/88   Lying   96    04/19/20 1925   97.4 (36.3)   --   88   18   125/84   --   96    04/19/20 1647   97.3 (36.3)   Oral   97   18   109/79   Lying   96    04/19/20 1550   97.2 (36.2)   Temporal   80   18   102/70   Lying   95    04/19/20 0813   96.8 (36)   Temporal   100   18   112/80   Lying   93    04/19/20 0610   96.9 (36.1)   Temporal   87   20   135/84   Lying   95    04/19/20 0001   97.5 (36.4)   Temporal   78   18   134/76   Lying   97                Current Facility-Administered Medications   Medication Dose Route Frequency Provider  Last Rate Last Dose   • aspirin EC tablet 81 mg  81 mg Oral Daily Bryan Dao MD   81 mg at 04/20/20 0828   • HYDROcodone-acetaminophen (NORCO) 5-325 MG per tablet 1 tablet  1 tablet Oral Q4H PRN Bryan Dao MD   1 tablet at 04/19/20 2148   • nystatin (MYCOSTATIN) powder   Topical Q12H Bryan Dao MD       • sodium chloride 0.9 % flush 10 mL  10 mL Intravenous Q12H Bryan Dao MD   10 mL at 04/20/20 0828   • sodium chloride 0.9 % flush 10 mL  10 mL Intravenous PRN Bryan Dao MD       • sodium chloride 0.9 % infusion  75 mL/hr Intravenous Continuous Bryan Dao MD         Lab Results (all)     Procedure Component Value Units Date/Time    SARS-CoV-2, PCR (IN-HOUSE), NP Swab in Transport Media - Swab, Nasopharynx [[id number]]  (Normal) Collected:  04/18/20 1926    Specimen:  Swab from Nasopharynx Updated:  04/19/20 1359     COVID19 Not Detected    Comprehensive Metabolic Panel [[id number]]  (Abnormal) Collected:  04/19/20 0643    Specimen:  Blood Updated:  04/19/20 0721     Glucose 117 mg/dL      BUN 14 mg/dL      Creatinine 0.93 mg/dL      Sodium 129 mmol/L      Potassium 3.6 mmol/L      Chloride 94 mmol/L      CO2 23.0 mmol/L      Calcium 9.0 mg/dL      Total Protein 6.3 g/dL      Albumin 3.20 g/dL      ALT (SGPT) 54 U/L      AST (SGOT) 42 U/L      Alkaline Phosphatase 88 U/L      Total Bilirubin 0.6 mg/dL      eGFR Non African Amer 83 mL/min/1.73      Globulin 3.1 gm/dL      A/G Ratio 1.0 g/dL      BUN/Creatinine Ratio 15.1     Anion Gap 12.0 mmol/L     Narrative:       GFR Normal >60  Chronic Kidney Disease <60  Kidney Failure <15      CK [[id number]]  (Abnormal) Collected:  04/19/20 0643    Specimen:  Blood Updated:  04/19/20 0721     Creatine Kinase 249 U/L     Lipid Panel [[id number]]  (Abnormal) Collected:  04/19/20 0643    Specimen:  Blood Updated:  04/19/20 0721     Total Cholesterol 114 mg/dL      Triglycerides 101 mg/dL      HDL Cholesterol 38 mg/dL      LDL Cholesterol  56 mg/dL       VLDL Cholesterol 20.2 mg/dL      LDL/HDL Ratio 1.47    Narrative:       Cholesterol Reference Ranges  (U.S. Department of Health and Human Services ATP III Classifications)    Desirable          <200 mg/dL  Borderline High    200-239 mg/dL  High Risk          >240 mg/dL      Triglyceride Reference Ranges  (U.S. Department of Health and Human Services ATP III Classifications)    Normal           <150 mg/dL  Borderline High  150-199 mg/dL  High             200-499 mg/dL  Very High        >500 mg/dL    HDL Reference Ranges  (U.S. Department of Health and Human Services ATP III Classifcations)    Low     <40 mg/dl (major risk factor for CHD)  High    >60 mg/dl ('negative' risk factor for CHD)        LDL Reference Ranges  (U.S. Department of Health and Human Services ATP III Classifcations)    Optimal          <100 mg/dL  Near Optimal     100-129 mg/dL  Borderline High  130-159 mg/dL  High             160-189 mg/dL  Very High        >189 mg/dL    CBC (No Diff) [[id number]]  (Abnormal) Collected:  04/19/20 0643    Specimen:  Blood Updated:  04/19/20 0656     WBC 12.36 10*3/mm3      RBC 4.81 10*6/mm3      Hemoglobin 13.7 g/dL      Hematocrit 39.9 %      MCV 83.0 fL      MCH 28.5 pg      MCHC 34.3 g/dL      RDW 13.2 %      RDW-SD 39.8 fl      MPV 10.8 fL      Platelets 235 10*3/mm3     Extra Tubes [[id number]] Collected:  04/18/20 1014    Specimen:  Blood, Venous Line Updated:  04/18/20 1116    Narrative:       The following orders were created for panel order Extra Tubes.  Procedure                               Abnormality         Status                     ---------                               -----------         ------                     Light Blue Top[[id number]]                                   Final result               Gold Top - SST[[id number]]                                   Final result                 Please view results for these tests on the individual orders.    Light Blue Top [[id number]] Collected:   04/18/20 1014    Specimen:  Blood Updated:  04/18/20 1116     Extra Tube hold for add-on     Comment: Auto resulted       Gold Top - SST [[id number]] Collected:  04/18/20 1014    Specimen:  Blood Updated:  04/18/20 1116     Extra Tube Hold for add-ons.     Comment: Auto resulted.       Comprehensive Metabolic Panel [[id number]]  (Abnormal) Collected:  04/18/20 1014    Specimen:  Blood Updated:  04/18/20 1044     Glucose 115 mg/dL      BUN 18 mg/dL      Creatinine 1.01 mg/dL      Sodium 132 mmol/L      Potassium 4.6 mmol/L      Chloride 93 mmol/L      CO2 25.0 mmol/L      Calcium 9.4 mg/dL      Total Protein 6.9 g/dL      Albumin 3.40 g/dL      ALT (SGPT) 54 U/L      AST (SGOT) 46 U/L      Alkaline Phosphatase 97 U/L      Total Bilirubin 0.9 mg/dL      eGFR Non African Amer 76 mL/min/1.73      Globulin 3.5 gm/dL      A/G Ratio 1.0 g/dL      BUN/Creatinine Ratio 17.8     Anion Gap 14.0 mmol/L     Narrative:       GFR Normal >60  Chronic Kidney Disease <60  Kidney Failure <15      Lipase [[id number]]  (Abnormal) Collected:  04/18/20 1014    Specimen:  Blood Updated:  04/18/20 1044     Lipase 94 U/L     CK [[id number]]  (Abnormal) Collected:  04/18/20 1014    Specimen:  Blood Updated:  04/18/20 1044     Creatine Kinase 446 U/L     Magnesium [[id number]]  (Normal) Collected:  04/18/20 1014    Specimen:  Blood Updated:  04/18/20 1044     Magnesium 1.9 mg/dL     Urinalysis With Culture If Indicated - Urine, Clean Catch [[id number]]  (Abnormal) Collected:  04/18/20 1020    Specimen:  Urine, Clean Catch Updated:  04/18/20 1028     Color, UA Dark Yellow     Appearance, UA Slightly Cloudy     pH, UA 5.5     Specific Gravity, UA 1.020     Glucose, UA Negative     Ketones, UA 15 mg/dL (1+)     Bilirubin, UA Small (1+)     Blood, UA Negative     Protein, UA Negative     Leuk Esterase, UA Negative     Nitrite, UA Negative     Urobilinogen, UA 4.0 E.U./dL    Narrative:       Urine microscopic not indicated.    CBC & Differential  [[id number]] Collected:  04/18/20 1014    Specimen:  Blood Updated:  04/18/20 1028    Narrative:       The following orders were created for panel order CBC & Differential.  Procedure                               Abnormality         Status                     ---------                               -----------         ------                     CBC Auto Differential[[id number]]        Abnormal            Final result                 Please view results for these tests on the individual orders.    CBC Auto Differential [[id number]]  (Abnormal) Collected:  04/18/20 1014    Specimen:  Blood Updated:  04/18/20 1028     WBC 14.63 10*3/mm3      RBC 5.29 10*6/mm3      Hemoglobin 15.0 g/dL      Hematocrit 44.5 %      MCV 84.1 fL      MCH 28.4 pg      MCHC 33.7 g/dL      RDW 13.1 %      RDW-SD 40.0 fl      MPV 11.1 fL      Platelets 229 10*3/mm3      Neutrophil % 76.1 %      Lymphocyte % 15.1 %      Monocyte % 6.9 %      Eosinophil % 0.8 %      Basophil % 0.3 %      Immature Grans % 0.8 %      Neutrophils, Absolute 11.15 10*3/mm3      Lymphocytes, Absolute 2.21 10*3/mm3      Monocytes, Absolute 1.01 10*3/mm3      Eosinophils, Absolute 0.11 10*3/mm3      Basophils, Absolute 0.04 10*3/mm3      Immature Grans, Absolute 0.11 10*3/mm3      nRBC 0.0 /100 WBC           Imaging Results (All)     Procedure Component Value Units Date/Time    CT Head Without Contrast [[id number]] Collected:  04/18/20 1634     Updated:  04/18/20 1658    Narrative:         CT Head Without Contrast    History: Generalized weakness. Fatigue.    Axial scans of the brain were obtained without intravenous  contrast.  Coronal and sagital reconstructions were preformed.    This exam was performed according to our departmental  dose-optimization program, which includes automated exposure  control, adjustment of the mA and/or kV according to patient size  and/or use of iterative reconstruction technique.    DLP: 1051.60    Comparison: None    Findings:  Bone windows  are unremarkable.  The visualized paranasal sinuses are unremarkable.    No acute process.  1.4 cm area of old ischemic change white matter right frontal  lobe.  No hemorrhage.  No mass.  No abnormal areas of increased attenuation.  No midline shift.  No abnormal extra-axial fluid collections.      Impression:       CONCLUSION:  No acute process.  1.4 cm area of old ischemic change white matter right frontal  lobe.    [id number]    Electronically signed by:  Mynor Tom MD  4/18/2020 4:57 PM CDT  Workstation: [id number]    XR Chest 1 View [[id number]] Collected:  04/18/20 0930     Updated:  04/18/20 1001    Narrative:       PROCEDURE: Portable chest x-ray    TECHNIQUE: Single AP view of the chest    COMPARISON: None    HISTORY: fatigue    FINDINGS:  The lungs appear clear. Heart, hilar, and mediastinal structures  appear normal. No osseous abnormality is seen.      Impression:       CONCLUSION:  No acute pulmonary disease.    Electronically signed by:  Bubba Pina MD  4/18/2020 10:00 AM  CDT Workstation: [id number]             Physician Progress Notes (most recent note)      Bubba Wood MD at 04/19/20 1415              [facility] Medicine Services  INPATIENT PROGRESS NOTE    Length of Stay: 0  Date of Admission: 4/18/2020  Primary Care Physician: Rita Herman APRN    Subjective     HPI: Good appetite.  No change per nursing report    Review of Systems   Denies headache, chest pain, vomiting, diarrhea, fever, rash.  All pertinent negatives and positives are as above. All other systems have been reviewed and are negative unless otherwise stated.     Objective    Temp:  [96.8 °F (36 °C)-98.6 °F (37 °C)] 96.8 °F (36 °C)  Heart Rate:  [] 100  Resp:  [18-20] 18  BP: (112-152)/(72-97) 112/80    Physical Exam  General: No apparent distress.  Alert and Appropriate  HEENT: oral and nasal mucosa without erythema/exudate  Neck: S/NT/ND; no LAD, bruits  CV: RRR, no  m/r/g  Lung: CTA B; symmetric rise and fall of the chest  Abd: S/NT/ND, BS+  Ext: No Clubbing, cyanosis  Skin: no rash, ecchymosis, skin breakdown  MSK: no synovitis or deformity  Neuro: symmetric facial motor; spontaneous and purposeful movement of all four extremities  Psych: stable mood and apparent affect      Results Review:  I have reviewed the labs, radiology results, and diagnostic studies.    Laboratory Data:   Results from last 7 days   Lab Units 04/19/20  0643 04/18/20  1014   SODIUM mmol/L 129* 132*   POTASSIUM mmol/L 3.6 4.6   CHLORIDE mmol/L 94* 93*   CO2 mmol/L 23.0 25.0   BUN mg/dL 14 18   CREATININE mg/dL 0.93 1.01   GLUCOSE mg/dL 117* 115*   CALCIUM mg/dL 9.0 9.4   BILIRUBIN mg/dL 0.6 0.9   ALK PHOS U/L 88 97   ALT (SGPT) U/L 54* 54*   AST (SGOT) U/L 42* 46*   ANION GAP mmol/L 12.0 14.0     Estimated Creatinine Clearance: 119.8 mL/min (by C-G formula based on SCr of 0.93 mg/dL).  Results from last 7 days   Lab Units 04/18/20  1014   MAGNESIUM mg/dL 1.9         Results from last 7 days   Lab Units 04/19/20  0643 04/18/20  1014   WBC 10*3/mm3 12.36* 14.63*   HEMOGLOBIN g/dL 13.7 15.0   HEMATOCRIT % 39.9 44.5   PLATELETS 10*3/mm3 235 229           Culture Data:   No results found for: BLOODCX  No results found for: URINECX  No results found for: RESPCX  No results found for: WOUNDCX  No results found for: STOOLCX  No components found for: BODYFLD    Radiology Data:   Imaging Results (Last 24 Hours)     Procedure Component Value Units Date/Time    CT Head Without Contrast [[id number]] Collected:  04/18/20 1634     Updated:  04/18/20 1658    Narrative:         CT Head Without Contrast    History: Generalized weakness. Fatigue.    Axial scans of the brain were obtained without intravenous  contrast.  Coronal and sagital reconstructions were preformed.    This exam was performed according to our departmental  dose-optimization program, which includes automated exposure  control, adjustment of the mA and/or  kV according to patient size  and/or use of iterative reconstruction technique.    DLP: 1051.60    Comparison: None    Findings:  Bone windows are unremarkable.  The visualized paranasal sinuses are unremarkable.    No acute process.  1.4 cm area of old ischemic change white matter right frontal  lobe.  No hemorrhage.  No mass.  No abnormal areas of increased attenuation.  No midline shift.  No abnormal extra-axial fluid collections.      Impression:       CONCLUSION:  No acute process.  1.4 cm area of old ischemic change white matter right frontal  lobe.    [id number]    Electronically signed by:  Mynor Tom MD  4/18/2020 4:57 PM CDT  Workstation: [id number]          I have reviewed the patient's current medications.     Assessment/Plan     Active Hospital Problems    Diagnosis   • **Generalized weakness   • Morbid obesity (CMS/HCC)   • Fatigue   • Hypertension       Plan: 59-year-old severe morbid obesity, hypertension, hyperlipidemia, previous stroke, here with    1.  Severe debility, functional paraplegia  2.  Severe morbid obesity  3.  Hypertension    Patient does not have a safe discharge and has longstanding and interval worsening of debility attributed to his body habitus.  Work with physical occupational therapy once COVID-19 test results negative.  Plan for referral to [facility] who is already accepted him pending a negative test.              Discharge Planning: I expect patient to be discharged to SNF in 1 days.    Bubba Wood MD  04/19/20  14:16      Electronically signed by Bubba Wood MD at 04/19/20 1420       Consult Notes (most recent note)    No notes of this type exist for this encounter.       SARS-CoV-2, PCR (IN-HOUSE), NP Swab in Transport Media - Swab, Nasopharynx [[id number]] (Order [id number])   Order   Date: 4/18/2020 Department: [address] Released By: Bob Avina RN (auto-released) Authorizing: Boubacar Augustin MD   Reprint  Order Requisition     SARS-CoV-2, PCR (IN-HOUSE), NP Swab in Transport Media - Swab, Nasopharynx (Order #[id number]) on 4/18/20       SARS-CoV-2, PCR (IN-HOUSE), NP Swab in Transport Media - Swab, Nasopharynx   Order: [id number]   Status:  Final result   Visible to patient:  No (Not Released)   Next appt:  None   Specimen Information: Nasopharynx; Swab        Component  Ref Range & Units    COVID19  Not Detected Not Detected    Resulting Agency [facility]         Specimen Collected: 04/18/20 19:26   Last Resulted: 04/19/20 13:59   Order Details View Encounter Lab and Collection Details Routing Result History            Result Read / Acknowledged      Acknowledge result   No acknowledgement history exists for this order.   Other Results from 4/18/2020      CBC (No Diff)  Final result 4/19/2020    Comprehensive Metabolic Panel  Final result 4/19/2020    CK  Final result 4/19/2020    Lipid Panel  Final result 4/19/2020    Urinalysis With Culture If Indicated - Urine, Clean Catch  Final result 4/18/2020    Comprehensive Metabolic Panel  Final result 4/18/2020    Lipase  Final result 4/18/2020    CK  Final result 4/18/2020    Magnesium  Final result 4/18/2020    CBC Auto Differential  Final result 4/18/2020    Light Blue Top  Final result 4/18/2020    Gold Top - SST  Final result 4/18/2020   (important suggestion)  Warning: Additional results from 4/18/2020 are available but are not displayed in this report.

## 2020-04-20 NOTE — DISCHARGE PLACEMENT REQUEST
"Bj Mcclellan (59 y.o. Male)     Date of Birth Social Security Number Address Home Phone MRN    1960  [id number]  [place] [address] [phone] [id number]    Jainism Marital Status          None        Admission Date Admission Type Admitting Provider Attending Provider Department, Room/Bed    4/18/20 Emergency Bryan Dao MD Popescu, Tudor, MD [address], 405/1    Discharge Date Discharge Disposition Discharge Destination         Skilled Nursing Facility (DC - External)              Attending Provider:  Bryan Dao MD    Allergies:  No Known Allergies    Isolation:  None   Infection:  None   Code Status:  CPR    Ht:  172.7 cm (68\")   Wt:  141 kg (311 lb 12.8 oz)    Admission Cmt:  None   Principal Problem:  Generalized weakness [R53.1]                 Active Insurance as of 4/18/2020     Primary Coverage     Payor Plan Insurance Group Employer/Plan Group    ANTHEM MEDICAID ANTHEM MEDICAID [id number]     Payor Plan Address Payor Plan Phone Number Payor Plan Fax Number Effective Dates    [address] [address] [phone]  2/12/2020 - None Entered    [facility] [id number]       Subscriber Name Subscriber Birth Date Member ID       BJ MCCLELLAN 1960 [id number]                 Emergency Contacts      (Rel.) Home Phone Work Phone Mobile Phone    Isma Pichardo (Relative) [phone] -- [phone]              "

## 2020-04-20 NOTE — PROGRESS NOTES
"Physicians Statement of Medical Necessity for  Ambulance Transportation    GENERAL INFORMATION     Name: Jacques Cordero  YOB: 1960  Medicare #:   Transport Date: 4/20/20 (Valid for round trips this date, or for scheduled repetitive trips for 60 days from the date signed below.)  Origin:  405 Destination: [facility], [address] [place] [address] [place] [address]  Is the Patient's stay covered under Medicare Part A (PPS/DRG?)No   Closest appropriate facility? Yes  If this a hosp-hosp transfer? No  Is this a hospice patient? No    MEDICAL NECESSITY QUESTIONAIRE    Ambulance Transportation is medically necessary only if other means of transportation are contraindicated or would be potentially harmful to the patient.  To meet this requirement, the patient must be either \"bed confined\" or suffer from a condition such that transport by means other than an ambulance is contraindicated by the patient's condition.  The following questions must be answered by the healthcare professional signing below for this form to be valid:     1) Describe the MEDICAL CONDITION (physical and/or mental) of this patient AT THE TIME OF AMBULANCE TRANSPORT that requires the patient to be transported in an ambulance, and why transport by other means is contraindicated by the patient's condition: Plan: 59-year-old severe morbid obesity, previous stroke,     1.  Severe debility, functional paraplegia  2.  Severe morbid obesity    Past Medical History:   Diagnosis Date   • Hypercholesteremia    • Hypertension    • Morbid obesity (CMS/HCC)    • Stroke (cerebrum) (CMS/HCC)       History reviewed. No pertinent surgical history.   2) Is this patient \"bed confined\" as defined below?Yes    To be \"bed confined\" the patient must satisfy all three of the following criteria:  (1) unable to get up from bed without assistance; AND (2) unable to ambulate;  AND (3) unable to sit in a chair or wheelchair.  3) Can this patient safely be " transported by car or wheelchair van (I.e., may safely sit during transport, without an attendant or monitoring?)No   4. In addition to completing questions 1-3 above, please check any of the following conditions that apply*:          *Note: supporting documentation for any boxes checked must be maintained in the patient's medical records Moderate/severe pain on movement, Unable to tolerate seated position for time needed to transport and Morbid obesity requires additional personnel/equipment to safely handle patient      SIGNATURE OF PHYSICIAN OR OTHER AUTHORIZED HEALTHCARE PROFESSIONAL    I certify that the above information is true and correct based on my evaluation of this patient, and represent that the patient requires transport by ambulance and that other forms of transport are contraindicated.  I understand that this information will be used by the Centers for Medicare and Medicaid Services (CMS) to support the determiniation of medical necessity for ambulance services, and I represent that I have personal knowledge of the patient's condition at the time of transport.       If this box is checked, I also certify that the patient is physically or mentally incapable of signing the ambulance service's claim form and that the institution with which I am affiliated has furnished care, services or assistance to the patient.  My signature below is made on behalf of the patient pursuant to 42 .36(b)(4). In accordance with 42 .37, the specific reason(s) that the patient is physically or mentally incapable of signing the claim for is as follows:     Signature of Physician or Healthcare Professional  Date/Time:        (For Scheduled repetitive transport, this form is not valid for transports performed more than 60 days after this date).                                                                                                                                             --------------------------------------------------------------------------------------------  Printed Name and Credentials of Physician or Authorized Healthcare Professional     *Form must be signed by patient's attending physician for scheduled, repetitive transports,.  For non-repetitive ambulance transports, if unable to obtain the signature of the attending physician, any of the following may sign (please select below):     Physician  Clinical Nurse Specialist  Registered Nurse     Physician Assistant  Discharge Planner  Licensed Practical Nurse     Nurse Practitioner

## 2020-04-20 NOTE — DISCHARGE SUMMARY
[facility] Medicine Services  DISCHARGE SUMMARY       Date of Admission: 4/18/2020  Date of Discharge:  4/20/2020  Primary Care Physician: Rita Herman APRN    Presenting Problem/History of Present Illness:  Cellulitis of lower extremity, unspecified laterality [L03.119]  Fatigue, unspecified type [R53.83]  Fatigue, unspecified type [R53.83]     Final Discharge Diagnoses:  Active Hospital Problems    Diagnosis   • **Generalized weakness   • Morbid obesity (CMS/HCC)   • Fatigue   • Hypertension   Impaired gait and mobility    Consults:   Consults     Date and Time Order Name Status Description    4/18/2020 2001 Hospitalist (on-call MD unless specified)            Procedures Performed: None                Pertinent Test Results:   Collected Updated Procedure Result Status    04/19/2020 0643 04/19/2020 0656 CBC (No Diff) [[id number]]   (Abnormal)   Blood    Final result Component Value Units   WBC 12.36High  10*3/mm3   RBC 4.81 10*6/mm3   Hemoglobin 13.7 g/dL   Hematocrit 39.9 %   MCV 83.0 fL   MCH 28.5 pg   MCHC 34.3 g/dL   RDW 13.2 %   RDW-SD 39.8 fl   MPV 10.8 fL   Platelets 235 10*3/mm3           04/19/2020 0643 04/19/2020 0721 Comprehensive Metabolic Panel [[id number]]    (Abnormal)   Blood    Final result Component Value Units   Glucose 117High  mg/dL   BUN 14 mg/dL   Creatinine 0.93 mg/dL   Sodium 129Low  mmol/L   Potassium 3.6 mmol/L   Chloride 94Low  mmol/L   CO2 23.0 mmol/L   Calcium 9.0 mg/dL   Total Protein 6.3 g/dL   Albumin 3.20Low  g/dL   ALT (SGPT) 54High  U/L   AST (SGOT) 42High  U/L   Alkaline Phosphatase 88 U/L   Total Bilirubin 0.6 mg/dL   eGFR Non African Am 83 mL/min/1.73   Globulin 3.1 gm/dL   A/G Ratio 1.0 g/dL   BUN/Creatinine Ratio 15.1    Anion Gap 12.0 mmol/L           04/19/2020 0643 04/19/2020 0721 CK [[id number]]   (Abnormal)   Blood    Final result Component Value Units   Creatine Kinase 249High  U/L           04/19/2020 0643 04/19/2020 0721  Lipid Panel [[id number]]    (Abnormal)   Blood    Final result Component Value Units   Total Cholesterol 114 mg/dL   Triglycerides 101 mg/dL   HDL Cholesterol 38Low  mg/dL   LDL Cholesterol  56 mg/dL   VLDL Cholesterol 20.2 mg/dL   LDL/HDL Ratio 1.47            04/18/2020 1926 04/19/2020 1359 SARS-CoV-2, PCR (IN-HOUSE), NP Swab in Transport Media - Swab, Nasopharynx [[id number]]   Swab from Nasopharynx    Final result Component Value   COVID19 Not Detected              04/18/2020 1020 04/18/2020 1028 Urinalysis With Culture If Indicated - Urine, Clean Catch [[id number]]    (Abnormal)   Urine, Clean Catch    Final result Component Value   Color, UA Dark Yellow   Appearance, UA Slightly CloudyAbnormal    pH, UA 5.5   Specific Gravity, UA 1.020   Glucose Negative   Ketones, UA 15 mg/dL (1+)Abnormal    Bilirubin, UA Small (1+)Abnormal    Blood, UA Negative   Protein, UA Negative   Leukocytes, UA Negative   Nitrite, UA Negative   Urobilinogen, UA 4.0 E.U./dLAbnormal            04/18/2020 1014 04/18/2020 1044 Comprehensive Metabolic Panel [[id number]]    (Abnormal)   Blood    Final result Component Value Units   Glucose 115High  mg/dL   BUN 18 mg/dL   Creatinine 1.01 mg/dL   Sodium 132Low  mmol/L   Potassium 4.6 mmol/L   Chloride 93Low  mmol/L   CO2 25.0 mmol/L   Calcium 9.4 mg/dL   Total Protein 6.9 g/dL   Albumin 3.40Low  g/dL   ALT (SGPT) 54High  U/L   AST (SGOT) 46High  U/L   Alkaline Phosphatase 97 U/L   Total Bilirubin 0.9 mg/dL   eGFR Non African Am 76 mL/min/1.73   Globulin 3.5 gm/dL   A/G Ratio 1.0 g/dL   BUN/Creatinine Ratio 17.8    Anion Gap 14.0 mmol/L           04/18/2020 1014 04/18/2020 1044 Lipase [[id number]]   (Abnormal)   Blood    Final result Component Value Units   Lipase 94High  U/L           04/18/2020 1014 04/18/2020 1044 CK [[id number]]   (Abnormal)   Blood    Final result Component Value Units   Creatine Kinase 446High  U/L           04/18/2020 1014 04/18/2020 1044 Magnesium [[id number]]   Blood    Final  result Component Value Units   Magnesium 1.9 mg/dL           04/18/2020 1014 04/18/2020 1028 CBC Auto Differential [[id number]]   (Abnormal)   Blood    Final result Component Value Units   WBC 14.63High  10*3/mm3   RBC 5.29 10*6/mm3   Hemoglobin 15.0 g/dL   Hematocrit 44.5 %   MCV 84.1 fL   MCH 28.4 pg   MCHC 33.7 g/dL   RDW 13.1 %   RDW-SD 40.0 fl   MPV 11.1 fL   Platelets 229 10*3/mm3   Neutrophil Rel % 76.1High  %   Lymphocyte Rel % 15.1Low  %   Monocyte Rel % 6.9 %   Eosinophil Rel % 0.8 %   Basophil Rel % 0.3 %   Immature Granulocyte Rel % 0.8High  %   Neutrophils Absolute 11.15High  10*3/mm3   Lymphocytes Absolute 2.21 10*3/mm3   Monocytes Absolute 1.01High  10*3/mm3   Eosinophils Absolute 0.11 10*3/mm3   Basophils Absolute 0.04 10*3/mm3   Immature Grans, Absolute 0.11High  10*3/mm3   nRBC 0.0 /100 WBC             Radiology Results (last 21 days)     Procedure Component Value Units Date/Time   CT Head Without Contrast [[id number]] Eliud as Reviewed   Order Status: Completed Collected: 04/18/20 1634    Updated: 04/18/20 1658   Narrative:       CT Head Without Contrast    History: Generalized weakness. Fatigue.    Axial scans of the brain were obtained without intravenous  contrast.  Coronal and sagital reconstructions were preformed.    This exam was performed according to our departmental  dose-optimization program, which includes automated exposure  control, adjustment of the mA and/or kV according to patient size  and/or use of iterative reconstruction technique.    DLP: 1051.60    Comparison: None    Findings:  Bone windows are unremarkable.  The visualized paranasal sinuses are unremarkable.    No acute process.  1.4 cm area of old ischemic change white matter right frontal  lobe.  No hemorrhage.  No mass.  No abnormal areas of increased attenuation.  No midline shift.  No abnormal extra-axial fluid collections.   Impression:     CONCLUSION:  No acute process.  1.4 cm area of old ischemic change white matter  right frontal  lobe.    [id number]    Electronically signed by:  Mynor Tom MD  4/18/2020 4:57 PM CDT  Workstation: [id number]   XR Chest 1 View [[id number]] Eliud as Reviewed   Order Status: Completed Collected: 04/18/20 0930    Updated: 04/18/20 1001   Narrative:     PROCEDURE: Portable chest x-ray    TECHNIQUE: Single AP view of the chest    COMPARISON: None    HISTORY: fatigue    FINDINGS:  The lungs appear clear. Heart, hilar, and mediastinal structures  appear normal. No osseous abnormality is seen.   Impression:     CONCLUSION:  No acute pulmonary disease.    Electronically signed by:  Bubba Pina MD  4/18/2020 10:00 AM  CDT Workstation: [id number]     Collected Updated Procedure Result Status    04/18/2020 1926 04/19/2020 1359 SARS-CoV-2, PCR (IN-HOUSE), NP Swab in Transport Media - Swab, Nasopharynx [[id number]]   Swab from Nasopharynx    Final result Component Value   COVID19 Not Detected                  Chief Complaint on Day of Discharge: None    Hospital Course:  The patient is a 59 y.o. male with a past medical history of morbid obesity, essential hypertension and stroke who presented to [facility] with complaints of generalized weakness, poor appetite, and inability to perform the ADLs and care for himself of 7 days duration.  Patient stated that he has been tired and weak for the past 7 days and spent a lot of time lying on the floor.  He was too weak to get up and go to the bathroom and also had decreased oral intake.  He stated he had not eaten in about a week and had decreased urinary output.  He also complained of multiple falls during the past 7 days and stated that his wife could not look after him at home anymore.  Adult Protective Services were contacted and were aware of the patient's condition.  In the ED was unable to ambulate independently with a walker due to generalized weakness.  He was admitted and treated for generalized weakness, impaired gait and mobility, dehydration and  "essential hypertension.  He was given IV fluids and his appetite improved.  He was significantly deconditioned and was recommended for placement in a skilled nursing facility.  His COVID-19 testing was negative.  Patient was discharged to a skilled nursing facility for placement and Adult Protective Services were aware of the patient's location.     Condition on Discharge: Stable    Physical Exam on Discharge:  /82 (BP Location: Right arm, Patient Position: Lying)   Pulse 97   Temp 97.7 °F (36.5 °C) (Axillary)   Resp 18   Ht 172.7 cm (68\")   Wt (!) 141 kg (311 lb 12.8 oz)   SpO2 97%   BMI 47.41 kg/m²      Physical Exam   Constitutional: He is oriented to person, place, and time. He appears well-developed and well-nourished. No distress.   HENT:   Head: Normocephalic and atraumatic.   Mouth/Throat: Oropharynx is clear and moist. No oropharyngeal exudate.   Eyes: Pupils are equal, round, and reactive to light. Conjunctivae and EOM are normal. No scleral icterus.   Neck: Normal range of motion. Neck supple. No JVD present. No tracheal deviation present. No thyromegaly present.   Cardiovascular: Normal rate, regular rhythm, normal heart sounds and intact distal pulses. Exam reveals no gallop and no friction rub.   No murmur heard.  Pulmonary/Chest: Effort normal and breath sounds normal. No stridor. No respiratory distress. He has no wheezes. He has no rales. He exhibits no tenderness.   Abdominal: Soft. Bowel sounds are normal. He exhibits no distension and no mass. There is no tenderness. There is no rebound and no guarding. No hernia.   Musculoskeletal: Normal range of motion. He exhibits no edema, tenderness or deformity.   Lymphadenopathy:     He has no cervical adenopathy.   Neurological: He is alert and oriented to person, place, and time. No cranial nerve deficit. He exhibits normal muscle tone.   Skin: Skin is warm and dry. No rash noted. He is not diaphoretic. No erythema. No pallor. "   Psychiatric: He has a normal mood and affect. His behavior is normal. Judgment and thought content normal.     Discharge Disposition: Skilled nursing facility      Discharge Medications:     Discharge Medications      New Medications      Instructions Start Date   aspirin 81 MG EC tablet   81 mg, Oral, Daily   Start Date:  April 21, 2020     HYDROcodone-acetaminophen 5-325 MG per tablet  Commonly known as:  NORCO   1 tablet, Oral, Every 4 Hours PRN      nystatin 023209 UNIT/GM powder  Commonly known as:  MYCOSTATIN   Topical, Every 12 Hours Scheduled             Discharge Diet:  Regular diet    Activity at Discharge: Self-limited activity    Discharge Care Plan/Instructions:   1.  Follow-up with your primary care provider within 1 week of discharge.   2.  Patient should have physical and occupational therapy at the skilled nursing facility for impaired gait and mobility and generalized weakness.    Follow-up Appointments:   No future appointments.    Test Results Pending at Discharge:     Eddie Parikh MD  04/20/20  12:18    Time: 36 minutes of time was spent evaluating patient and planning discharge.    The patient was evaluated during the global COVID-19 pandemic, and the diagnosis was suspected/considered upon their initial presentation. Their evaluation, treatment, and testing was consistent with current guidelines for patients who present with complaints or symptoms that may be related to COVID-19.    Part of this note may be an electronic transcription/translation of spoken language to printed text using the Dragon Dictation system.

## 2020-04-20 NOTE — PLAN OF CARE
Problem: Patient Care Overview  Goal: Plan of Care Review  Flowsheets  Taken 4/20/2020 1249  Progress: improving  Taken 4/20/2020 0919  Plan of Care Reviewed With: patient     Problem: Skin Injury Risk (Adult)  Goal: Skin Health and Integrity  Flowsheets (Taken 4/20/2020 1249)  Skin Health and Integrity: making progress toward outcome     Problem: Skin and Soft Tissue Infection (Adult)  Goal: Signs and Symptoms of Listed Potential Problems Will be Absent, Minimized or Managed (Skin and Soft Tissue Infection)  Flowsheets (Taken 4/20/2020 1249)  Problems Assessed (Skin and Soft Tissue Infection): all  Problems Present (Skin/Soft Tissue Inf): situational response     Problem: Activity Intolerance (Adult)  Goal: Activity Tolerance  Flowsheets (Taken 4/20/2020 1249)  Activity Tolerance: making progress toward outcome     Problem: Activity Intolerance (Adult)  Goal: Effective Energy Conservation Techniques  Flowsheets (Taken 4/20/2020 1249)  Effective Energy Conservation Techniques: making progress toward outcome

## 2020-04-21 NOTE — PAYOR COMM NOTE
"Francine Paredes  [facility]  (P)[phone]  (F)[phone]      Ref#[id number]    Bj Cordero (59 y.o. Male)     Date of Birth Social Security Number Address Home Phone MRN    1960  [id number] [address]  [place] [address] [phone] [id number]    Buddhism Marital Status          None        Admission Date Admission Type Admitting Provider Attending Provider Department, Room/Bed    4/18/20 Emergency Bryan Dao MD  [facility] [address], 405/1    Discharge Date Discharge Disposition Discharge Destination        4/20/2020 Skilled Nursing Facility (DC - External)              Attending Provider:  (none)   Allergies:  No Known Allergies    Isolation:  None   Infection:  None   Code Status:  Prior    Ht:  172.7 cm (68\")   Wt:  141 kg (311 lb 12.8 oz)    Admission Cmt:  None   Principal Problem:  Generalized weakness [R53.1]                 Active Insurance as of 4/18/2020     Primary Coverage     Payor Plan Insurance Group Employer/Plan Group    ANTHEM MEDICAID AN[place]M MEDICAID [id number]     Payor Plan Address Payor Plan Phone Number Payor Plan Fax Number Effective Dates    [address] [address] [phone]  2/12/2020 - None Entered    [facility] [phone]       Subscriber Name Subscriber Birth Date Member ID       BJ CORDERO 1960 [id number]                 Emergency Contacts      (Rel.) Home Phone Work Phone Mobile Phone    Isma Pichardo (Relative) [phone] -- [phone]               Discharge Summary      Eddie Parikh MD at 04/20/20 [facility]              [facility]  DISCHARGE SUMMARY       Date of Admission: 4/18/2020  Date of Discharge:  4/20/2020  Primary Care Physician: Rita Herman APRN    Presenting Problem/History of Present Illness:  Cellulitis of lower extremity, unspecified laterality [L03.119]  Fatigue, unspecified type [R53.83]  Fatigue, unspecified type " [R53.83]     Final Discharge Diagnoses:  Active Hospital Problems    Diagnosis   • **Generalized weakness   • Morbid obesity (CMS/HCC)   • Fatigue   • Hypertension   Impaired gait and mobility    Consults:   Consults     Date and Time Order Name Status Description    4/18/2020 2001 Hospitalist (on-call MD unless specified)            Procedures Performed: None                Pertinent Test Results:   Collected Updated Procedure Result Status    04/19/2020 0643 04/19/2020 0656 CBC (No Diff) [[id number]]   (Abnormal)   Blood    Final result Component Value Units   WBC 12.36High  10*3/mm3   RBC 4.81 10*6/mm3   Hemoglobin 13.7 g/dL   Hematocrit 39.9 %   MCV 83.0 fL   MCH 28.5 pg   MCHC 34.3 g/dL   RDW 13.2 %   RDW-SD 39.8 fl   MPV 10.8 fL   Platelets 235 10*3/mm3           04/19/2020 0643 04/19/2020 0721 Comprehensive Metabolic Panel [[id number]]    (Abnormal)   Blood    Final result Component Value Units   Glucose 117High  mg/dL   BUN 14 mg/dL   Creatinine 0.93 mg/dL   Sodium 129Low  mmol/L   Potassium 3.6 mmol/L   Chloride 94Low  mmol/L   CO2 23.0 mmol/L   Calcium 9.0 mg/dL   Total Protein 6.3 g/dL   Albumin 3.20Low  g/dL   ALT (SGPT) 54High  U/L   AST (SGOT) 42High  U/L   Alkaline Phosphatase 88 U/L   Total Bilirubin 0.6 mg/dL   eGFR Non African Am 83 mL/min/1.73   Globulin 3.1 gm/dL   A/G Ratio 1.0 g/dL   BUN/Creatinine Ratio 15.1    Anion Gap 12.0 mmol/L           04/19/2020 0643 04/19/2020 0721 CK [[id number]]   (Abnormal)   Blood    Final result Component Value Units   Creatine Kinase 249High  U/L           04/19/2020 0643 04/19/2020 0721 Lipid Panel [[id number]]    (Abnormal)   Blood    Final result Component Value Units   Total Cholesterol 114 mg/dL   Triglycerides 101 mg/dL   HDL Cholesterol 38Low  mg/dL   LDL Cholesterol  56 mg/dL   VLDL Cholesterol 20.2 mg/dL   LDL/HDL Ratio 1.47            04/18/2020 1926 04/19/2020 1359 SARS-CoV-2, PCR (IN-HOUSE), NP Swab in Transport Media - Swab, Nasopharynx [[id number]]      Swab from Nasopharynx    Final result Component Value   COVID19 Not Detected              04/18/2020 1020 04/18/2020 1028 Urinalysis With Culture If Indicated - Urine, Clean Catch [[id number]]    (Abnormal)   Urine, Clean Catch    Final result Component Value   Color, UA Dark Yellow   Appearance, UA Slightly CloudyAbnormal    pH, UA 5.5   Specific Gravity, UA 1.020   Glucose Negative   Ketones, UA 15 mg/dL (1+)Abnormal    Bilirubin, UA Small (1+)Abnormal    Blood, UA Negative   Protein, UA Negative   Leukocytes, UA Negative   Nitrite, UA Negative   Urobilinogen, UA 4.0 E.U./dLAbnormal            04/18/2020 1014 04/18/2020 1044 Comprehensive Metabolic Panel [[id number]]    (Abnormal)   Blood    Final result Component Value Units   Glucose 115High  mg/dL   BUN 18 mg/dL   Creatinine 1.01 mg/dL   Sodium 132Low  mmol/L   Potassium 4.6 mmol/L   Chloride 93Low  mmol/L   CO2 25.0 mmol/L   Calcium 9.4 mg/dL   Total Protein 6.9 g/dL   Albumin 3.40Low  g/dL   ALT (SGPT) 54High  U/L   AST (SGOT) 46High  U/L   Alkaline Phosphatase 97 U/L   Total Bilirubin 0.9 mg/dL   eGFR Non African Am 76 mL/min/1.73   Globulin 3.5 gm/dL   A/G Ratio 1.0 g/dL   BUN/Creatinine Ratio 17.8    Anion Gap 14.0 mmol/L           04/18/2020 1014 04/18/2020 1044 Lipase [[id number]]   (Abnormal)   Blood    Final result Component Value Units   Lipase 94High  U/L           04/18/2020 1014 04/18/2020 1044 CK [[id number]]   (Abnormal)   Blood    Final result Component Value Units   Creatine Kinase 446High  U/L           04/18/2020 1014 04/18/2020 1044 Magnesium [[id number]]   Blood    Final result Component Value Units   Magnesium 1.9 mg/dL           04/18/2020 1014 04/18/2020 1028 CBC Auto Differential [[id number]]   (Abnormal)   Blood    Final result Component Value Units   WBC 14.63High  10*3/mm3   RBC 5.29 10*6/mm3   Hemoglobin 15.0 g/dL   Hematocrit 44.5 %   MCV 84.1 fL   MCH 28.4 pg   MCHC 33.7 g/dL   RDW 13.1 %   RDW-SD 40.0 fl   MPV 11.1 fL   Platelets 229  10*3/mm3   Neutrophil Rel % 76.1High  %   Lymphocyte Rel % 15.1Low  %   Monocyte Rel % 6.9 %   Eosinophil Rel % 0.8 %   Basophil Rel % 0.3 %   Immature Granulocyte Rel % 0.8High  %   Neutrophils Absolute 11.15High  10*3/mm3   Lymphocytes Absolute 2.21 10*3/mm3   Monocytes Absolute 1.01High  10*3/mm3   Eosinophils Absolute 0.11 10*3/mm3   Basophils Absolute 0.04 10*3/mm3   Immature Grans, Absolute 0.11High  10*3/mm3   nRBC 0.0 /100 WBC             Radiology Results (last 21 days)     Procedure Component Value Units Date/Time   CT Head Without Contrast [[id number]] Eliud as Reviewed   Order Status: Completed Collected: 04/18/20 1634    Updated: 04/18/20 1658   Narrative:       CT Head Without Contrast    History: Generalized weakness. Fatigue.    Axial scans of the brain were obtained without intravenous  contrast.  Coronal and sagital reconstructions were preformed.    This exam was performed according to our departmental  dose-optimization program, which includes automated exposure  control, adjustment of the mA and/or kV according to patient size  and/or use of iterative reconstruction technique.    DLP: 1051.60    Comparison: None    Findings:  Bone windows are unremarkable.  The visualized paranasal sinuses are unremarkable.    No acute process.  1.4 cm area of old ischemic change white matter right frontal  lobe.  No hemorrhage.  No mass.  No abnormal areas of increased attenuation.  No midline shift.  No abnormal extra-axial fluid collections.   Impression:     CONCLUSION:  No acute process.  1.4 cm area of old ischemic change white matter right frontal  lobe.    [id number]    Electronically signed by:  Mynor Tom MD  4/18/2020 4:57 PM CDT  Workstation: [id number]   XR Chest 1 View [[id number]] Eliud as Reviewed   Order Status: Completed Collected: 04/18/20 0930    Updated: 04/18/20 1001   Narrative:     PROCEDURE: Portable chest x-ray    TECHNIQUE: Single AP view of the chest    COMPARISON: None    HISTORY:  fatigue    FINDINGS:  The lungs appear clear. Heart, hilar, and mediastinal structures  appear normal. No osseous abnormality is seen.   Impression:     CONCLUSION:  No acute pulmonary disease.    Electronically signed by:  Bubba Pina MD  4/18/2020 10:00 AM  CDT Workstation: [id number]     Collected Updated Procedure Result Status    04/18/2020 1926 04/19/2020 1359 SARS-CoV-2, PCR (IN-HOUSE), NP Swab in Transport Media - Swab, Nasopharynx [[id number]]   Swab from Nasopharynx    Final result Component Value   COVID19 Not Detected                  Chief Complaint on Day of Discharge: None    Hospital Course:  The patient is a 59 y.o. male with a past medical history of morbid obesity, essential hypertension and stroke who presented to [facility] with complaints of generalized weakness, poor appetite, and inability to perform the ADLs and care for himself of 7 days duration.  Patient stated that he has been tired and weak for the past 7 days and spent a lot of time lying on the floor.  He was too weak to get up and go to the bathroom and also had decreased oral intake.  He stated he had not eaten in about a week and had decreased urinary output.  He also complained of multiple falls during the past 7 days and stated that his wife could not look after him at home anymore.  Adult Protective Services were contacted and were aware of the patient's condition.  In the ED was unable to ambulate independently with a walker due to generalized weakness.  He was admitted and treated for generalized weakness, impaired gait and mobility, dehydration and essential hypertension.  He was given IV fluids and his appetite improved.  He was significantly deconditioned and was recommended for placement in a skilled nursing facility.  His COVID-19 testing was negative.  Patient was discharged to a skilled nursing facility for placement and Adult Protective Services were aware of the patient's location.     Condition on  "Discharge: Stable    Physical Exam on Discharge:  /82 (BP Location: Right arm, Patient Position: Lying)   Pulse 97   Temp 97.7 °F (36.5 °C) (Axillary)   Resp 18   Ht 172.7 cm (68\")   Wt (!) 141 kg (311 lb 12.8 oz)   SpO2 97%   BMI 47.41 kg/m²       Physical Exam   Constitutional: He is oriented to person, place, and time. He appears well-developed and well-nourished. No distress.   HENT:   Head: Normocephalic and atraumatic.   Mouth/Throat: Oropharynx is clear and moist. No oropharyngeal exudate.   Eyes: Pupils are equal, round, and reactive to light. Conjunctivae and EOM are normal. No scleral icterus.   Neck: Normal range of motion. Neck supple. No JVD present. No tracheal deviation present. No thyromegaly present.   Cardiovascular: Normal rate, regular rhythm, normal heart sounds and intact distal pulses. Exam reveals no gallop and no friction rub.   No murmur heard.  Pulmonary/Chest: Effort normal and breath sounds normal. No stridor. No respiratory distress. He has no wheezes. He has no rales. He exhibits no tenderness.   Abdominal: Soft. Bowel sounds are normal. He exhibits no distension and no mass. There is no tenderness. There is no rebound and no guarding. No hernia.   Musculoskeletal: Normal range of motion. He exhibits no edema, tenderness or deformity.   Lymphadenopathy:     He has no cervical adenopathy.   Neurological: He is alert and oriented to person, place, and time. No cranial nerve deficit. He exhibits normal muscle tone.   Skin: Skin is warm and dry. No rash noted. He is not diaphoretic. No erythema. No pallor.   Psychiatric: He has a normal mood and affect. His behavior is normal. Judgment and thought content normal.     Discharge Disposition: Skilled nursing facility      Discharge Medications:     Discharge Medications      New Medications      Instructions Start Date   aspirin 81 MG EC tablet   81 mg, Oral, Daily   Start Date:  April 21, 2020     HYDROcodone-acetaminophen 5-325 " MG per tablet  Commonly known as:  NORCO   1 tablet, Oral, Every 4 Hours PRN      nystatin 770803 UNIT/GM powder  Commonly known as:  MYCOSTATIN   Topical, Every 12 Hours Scheduled             Discharge Diet:  Regular diet    Activity at Discharge: Self-limited activity    Discharge Care Plan/Instructions:   1.  Follow-up with your primary care provider within 1 week of discharge.   2.  Patient should have physical and occupational therapy at the skilled nursing facility for impaired gait and mobility and generalized weakness.    Follow-up Appointments:   No future appointments.    Test Results Pending at Discharge:     Tevin Parikh MD  04/20/20  12:18    Time: 36 minutes of time was spent evaluating patient and planning discharge.    The patient was evaluated during the global COVID-19 pandemic, and the diagnosis was suspected/considered upon their initial presentation. Their evaluation, treatment, and testing was consistent with current guidelines for patients who present with complaints or symptoms that may be related to COVID-19.    Part of this note may be an electronic transcription/translation of spoken language to printed text using the Dragon Dictation system.          Electronically signed by Tevin Parikh MD at 04/20/20 1903       Discharge Order (From admission, onward)     Start     Ordered    04/20/20 1223  Discharge patient  Once     Expected Discharge Date:  04/20/20    Expected Discharge Time:  Midday    Discharge Disposition:  Skilled Nursing Facility (DC - External)    Physician of Record for Attribution - Please select from Treatment Team:  TEVIN PARIKH [[id number]]    Review needed by CMO to determine Physician of Record:  No       Question Answer Comment   Physician of Record for Attribution - Please select from Treatment Team TEVIN PARIKH    Review needed by CMO to determine Physician of Record No        04/20/20 3954

## 2023-06-05 NOTE — ED NOTES
[place] and danielle given to patient     Viola Shankar, RN  04/18/20 4005     [place] Counseling: I discussed with the patient the risks of tildrakizumab including but not limited to immunosuppression, malignancy, posterior leukoencephalopathy syndrome, and serious infections.  The patient understands that monitoring is required including a PPD at baseline and must alert us or the primary physician if symptoms of infection or other concerning signs are noted.

## 2023-09-06 NOTE — ED NOTES
Called Isma ZARCO On patients emergency contact list.  He will come pick patient up.       Viola Shankar RN  04/18/20 1980     Patient/Caregiver provided printed discharge information.